# Patient Record
Sex: FEMALE | Race: WHITE | NOT HISPANIC OR LATINO | URBAN - METROPOLITAN AREA
[De-identification: names, ages, dates, MRNs, and addresses within clinical notes are randomized per-mention and may not be internally consistent; named-entity substitution may affect disease eponyms.]

---

## 2017-01-27 ENCOUNTER — HOSPITAL ENCOUNTER (OUTPATIENT)
Dept: CT IMAGING | Facility: CLINIC | Age: 72
Setting detail: RADIATION/ONCOLOGY SERIES
Discharge: STILL ADMITTED OR ADMINISTRATIVE | End: 2017-01-27
Attending: INTERNAL MEDICINE

## 2017-01-27 DIAGNOSIS — C34.12 MALIGNANT NEOPLASM OF UPPER LOBE OF LEFT LUNG (H): ICD-10-CM

## 2017-01-31 ENCOUNTER — OFFICE VISIT - HEALTHEAST (OUTPATIENT)
Dept: ONCOLOGY | Facility: HOSPITAL | Age: 72
End: 2017-01-31

## 2017-01-31 ENCOUNTER — AMBULATORY - HEALTHEAST (OUTPATIENT)
Dept: INFUSION THERAPY | Facility: HOSPITAL | Age: 72
End: 2017-01-31

## 2017-01-31 DIAGNOSIS — C34.12 MALIGNANT NEOPLASM OF UPPER LOBE OF LEFT LUNG (H): ICD-10-CM

## 2017-02-06 ENCOUNTER — COMMUNICATION - HEALTHEAST (OUTPATIENT)
Dept: FAMILY MEDICINE | Facility: CLINIC | Age: 72
End: 2017-02-06

## 2017-02-06 DIAGNOSIS — R79.9 ABNORMAL BLOOD CHEMISTRY: ICD-10-CM

## 2017-02-09 ENCOUNTER — COMMUNICATION - HEALTHEAST (OUTPATIENT)
Dept: FAMILY MEDICINE | Facility: CLINIC | Age: 72
End: 2017-02-09

## 2017-02-09 DIAGNOSIS — I10 ESSENTIAL HYPERTENSION: ICD-10-CM

## 2017-02-22 ENCOUNTER — COMMUNICATION - HEALTHEAST (OUTPATIENT)
Dept: ADMINISTRATIVE | Facility: HOSPITAL | Age: 72
End: 2017-02-22

## 2017-02-23 ENCOUNTER — COMMUNICATION - HEALTHEAST (OUTPATIENT)
Dept: FAMILY MEDICINE | Facility: CLINIC | Age: 72
End: 2017-02-23

## 2017-02-23 DIAGNOSIS — R52 PAIN: ICD-10-CM

## 2017-03-29 ENCOUNTER — HOSPITAL ENCOUNTER (OUTPATIENT)
Dept: CT IMAGING | Facility: CLINIC | Age: 72
Discharge: HOME | End: 2017-03-29
Attending: INTERNAL MEDICINE

## 2017-03-29 DIAGNOSIS — C34.12 MALIGNANT NEOPLASM OF UPPER LOBE OF LEFT LUNG (H): ICD-10-CM

## 2017-03-30 ENCOUNTER — AMBULATORY - HEALTHEAST (OUTPATIENT)
Dept: INFUSION THERAPY | Facility: HOSPITAL | Age: 72
End: 2017-03-30

## 2017-03-30 ENCOUNTER — OFFICE VISIT - HEALTHEAST (OUTPATIENT)
Dept: ONCOLOGY | Facility: HOSPITAL | Age: 72
End: 2017-03-30

## 2017-03-30 DIAGNOSIS — C34.12 MALIGNANT NEOPLASM OF UPPER LOBE OF LEFT LUNG (H): ICD-10-CM

## 2017-03-30 ASSESSMENT — MIFFLIN-ST. JEOR: SCORE: 1829.92

## 2017-03-31 ENCOUNTER — COMMUNICATION - HEALTHEAST (OUTPATIENT)
Dept: ONCOLOGY | Facility: CLINIC | Age: 72
End: 2017-03-31

## 2017-04-03 ENCOUNTER — COMMUNICATION - HEALTHEAST (OUTPATIENT)
Dept: ONCOLOGY | Facility: CLINIC | Age: 72
End: 2017-04-03

## 2017-04-19 ENCOUNTER — COMMUNICATION - HEALTHEAST (OUTPATIENT)
Dept: FAMILY MEDICINE | Facility: CLINIC | Age: 72
End: 2017-04-19

## 2017-04-19 DIAGNOSIS — R52 PAIN: ICD-10-CM

## 2017-05-08 ENCOUNTER — COMMUNICATION - HEALTHEAST (OUTPATIENT)
Dept: FAMILY MEDICINE | Facility: CLINIC | Age: 72
End: 2017-05-08

## 2017-05-10 ENCOUNTER — COMMUNICATION - HEALTHEAST (OUTPATIENT)
Dept: FAMILY MEDICINE | Facility: CLINIC | Age: 72
End: 2017-05-10

## 2017-05-24 ENCOUNTER — COMMUNICATION - HEALTHEAST (OUTPATIENT)
Dept: FAMILY MEDICINE | Facility: CLINIC | Age: 72
End: 2017-05-24

## 2017-05-24 ENCOUNTER — AMBULATORY - HEALTHEAST (OUTPATIENT)
Dept: FAMILY MEDICINE | Facility: CLINIC | Age: 72
End: 2017-05-24

## 2017-05-25 ENCOUNTER — AMBULATORY - HEALTHEAST (OUTPATIENT)
Dept: ONCOLOGY | Facility: HOSPITAL | Age: 72
End: 2017-05-25

## 2017-05-25 DIAGNOSIS — C34.12 MALIGNANT NEOPLASM OF UPPER LOBE OF LEFT LUNG (H): ICD-10-CM

## 2017-05-26 ENCOUNTER — AMBULATORY - HEALTHEAST (OUTPATIENT)
Dept: ONCOLOGY | Facility: HOSPITAL | Age: 72
End: 2017-05-26

## 2017-05-26 ENCOUNTER — HOME CARE/HOSPICE - HEALTHEAST (OUTPATIENT)
Dept: HOME HEALTH SERVICES | Facility: HOME HEALTH | Age: 72
End: 2017-05-26

## 2017-05-26 ENCOUNTER — COMMUNICATION - HEALTHEAST (OUTPATIENT)
Dept: ONCOLOGY | Facility: CLINIC | Age: 72
End: 2017-05-26

## 2017-05-26 DIAGNOSIS — I89.0 LYMPHEDEMA OF BOTH LOWER EXTREMITIES: ICD-10-CM

## 2017-05-30 ENCOUNTER — HOSPITAL ENCOUNTER (OUTPATIENT)
Dept: CT IMAGING | Facility: CLINIC | Age: 72
Discharge: HOME | End: 2017-05-30
Attending: INTERNAL MEDICINE

## 2017-05-30 DIAGNOSIS — C34.12 MALIGNANT NEOPLASM OF UPPER LOBE OF LEFT LUNG (H): ICD-10-CM

## 2017-05-31 ENCOUNTER — COMMUNICATION - HEALTHEAST (OUTPATIENT)
Dept: HOME HEALTH SERVICES | Facility: HOME HEALTH | Age: 72
End: 2017-05-31

## 2017-05-31 ENCOUNTER — HOME CARE/HOSPICE - HEALTHEAST (OUTPATIENT)
Dept: HOME HEALTH SERVICES | Facility: HOME HEALTH | Age: 72
End: 2017-05-31

## 2017-06-01 ENCOUNTER — AMBULATORY - HEALTHEAST (OUTPATIENT)
Dept: INFUSION THERAPY | Facility: HOSPITAL | Age: 72
End: 2017-06-01

## 2017-06-01 ENCOUNTER — HOME CARE/HOSPICE - HEALTHEAST (OUTPATIENT)
Dept: HOME HEALTH SERVICES | Facility: HOME HEALTH | Age: 72
End: 2017-06-01

## 2017-06-01 ENCOUNTER — OFFICE VISIT - HEALTHEAST (OUTPATIENT)
Dept: ONCOLOGY | Facility: HOSPITAL | Age: 72
End: 2017-06-01

## 2017-06-01 DIAGNOSIS — C34.12 MALIGNANT NEOPLASM OF UPPER LOBE OF LEFT LUNG (H): ICD-10-CM

## 2017-06-02 ENCOUNTER — HOME CARE/HOSPICE - HEALTHEAST (OUTPATIENT)
Dept: HOME HEALTH SERVICES | Facility: HOME HEALTH | Age: 72
End: 2017-06-02

## 2017-06-03 ENCOUNTER — HOME CARE/HOSPICE - HEALTHEAST (OUTPATIENT)
Dept: HOME HEALTH SERVICES | Facility: HOME HEALTH | Age: 72
End: 2017-06-03

## 2017-06-04 ENCOUNTER — HOME CARE/HOSPICE - HEALTHEAST (OUTPATIENT)
Dept: HOME HEALTH SERVICES | Facility: HOME HEALTH | Age: 72
End: 2017-06-04

## 2017-06-05 ENCOUNTER — HOME CARE/HOSPICE - HEALTHEAST (OUTPATIENT)
Dept: HOME HEALTH SERVICES | Facility: HOME HEALTH | Age: 72
End: 2017-06-05

## 2017-06-06 ENCOUNTER — COMMUNICATION - HEALTHEAST (OUTPATIENT)
Dept: FAMILY MEDICINE | Facility: CLINIC | Age: 72
End: 2017-06-06

## 2017-06-06 ENCOUNTER — HOME CARE/HOSPICE - HEALTHEAST (OUTPATIENT)
Dept: HOME HEALTH SERVICES | Facility: HOME HEALTH | Age: 72
End: 2017-06-06

## 2017-06-06 DIAGNOSIS — R52 PAIN: ICD-10-CM

## 2017-06-07 ENCOUNTER — HOME CARE/HOSPICE - HEALTHEAST (OUTPATIENT)
Dept: HOME HEALTH SERVICES | Facility: HOME HEALTH | Age: 72
End: 2017-06-07

## 2017-06-07 ENCOUNTER — AMBULATORY - HEALTHEAST (OUTPATIENT)
Dept: FAMILY MEDICINE | Facility: CLINIC | Age: 72
End: 2017-06-07

## 2017-06-07 DIAGNOSIS — R52 PAIN: ICD-10-CM

## 2017-06-08 ENCOUNTER — HOME CARE/HOSPICE - HEALTHEAST (OUTPATIENT)
Dept: HOME HEALTH SERVICES | Facility: HOME HEALTH | Age: 72
End: 2017-06-08

## 2017-06-09 ENCOUNTER — HOME CARE/HOSPICE - HEALTHEAST (OUTPATIENT)
Dept: HOME HEALTH SERVICES | Facility: HOME HEALTH | Age: 72
End: 2017-06-09

## 2017-06-12 ENCOUNTER — HOME CARE/HOSPICE - HEALTHEAST (OUTPATIENT)
Dept: HOME HEALTH SERVICES | Facility: HOME HEALTH | Age: 72
End: 2017-06-12

## 2017-07-10 ENCOUNTER — COMMUNICATION - HEALTHEAST (OUTPATIENT)
Dept: FAMILY MEDICINE | Facility: CLINIC | Age: 72
End: 2017-07-10

## 2017-07-10 DIAGNOSIS — R52 PAIN: ICD-10-CM

## 2017-07-28 ENCOUNTER — HOSPITAL ENCOUNTER (OUTPATIENT)
Dept: CT IMAGING | Facility: CLINIC | Age: 72
Discharge: HOME | End: 2017-07-28
Attending: INTERNAL MEDICINE

## 2017-07-28 DIAGNOSIS — C34.12 MALIGNANT NEOPLASM OF UPPER LOBE OF LEFT LUNG (H): ICD-10-CM

## 2017-08-01 ENCOUNTER — AMBULATORY - HEALTHEAST (OUTPATIENT)
Dept: INFUSION THERAPY | Facility: HOSPITAL | Age: 72
End: 2017-08-01

## 2017-08-01 ENCOUNTER — OFFICE VISIT - HEALTHEAST (OUTPATIENT)
Dept: ONCOLOGY | Facility: HOSPITAL | Age: 72
End: 2017-08-01

## 2017-08-01 DIAGNOSIS — C34.12 MALIGNANT NEOPLASM OF UPPER LOBE OF LEFT LUNG (H): ICD-10-CM

## 2017-08-01 DIAGNOSIS — E16.2 HYPOGLYCEMIA: ICD-10-CM

## 2017-08-02 LAB — HBA1C MFR BLD: 5.3 % (ref 4.2–6.1)

## 2017-08-07 ENCOUNTER — COMMUNICATION - HEALTHEAST (OUTPATIENT)
Dept: FAMILY MEDICINE | Facility: CLINIC | Age: 72
End: 2017-08-07

## 2017-08-08 ENCOUNTER — COMMUNICATION - HEALTHEAST (OUTPATIENT)
Dept: ONCOLOGY | Facility: HOSPITAL | Age: 72
End: 2017-08-08

## 2017-08-08 DIAGNOSIS — D64.9 ANEMIA: ICD-10-CM

## 2017-08-08 DIAGNOSIS — C34.12 MALIGNANT NEOPLASM OF UPPER LOBE OF LEFT LUNG (H): ICD-10-CM

## 2017-08-23 ENCOUNTER — COMMUNICATION - HEALTHEAST (OUTPATIENT)
Dept: FAMILY MEDICINE | Facility: CLINIC | Age: 72
End: 2017-08-23

## 2017-08-23 ENCOUNTER — COMMUNICATION - HEALTHEAST (OUTPATIENT)
Dept: ONCOLOGY | Facility: CLINIC | Age: 72
End: 2017-08-23

## 2017-08-23 DIAGNOSIS — R52 PAIN: ICD-10-CM

## 2017-08-29 ENCOUNTER — OFFICE VISIT - HEALTHEAST (OUTPATIENT)
Dept: FAMILY MEDICINE | Facility: CLINIC | Age: 72
End: 2017-08-29

## 2017-08-29 DIAGNOSIS — G89.29 CHRONIC PAIN: ICD-10-CM

## 2017-08-29 DIAGNOSIS — R11.0 NAUSEA: ICD-10-CM

## 2017-08-29 DIAGNOSIS — I87.8 LOWER EXTREMITY VENOUS STASIS: ICD-10-CM

## 2017-08-29 DIAGNOSIS — G04.91 MYELITIS (H): ICD-10-CM

## 2017-08-29 DIAGNOSIS — R06.02 SOB (SHORTNESS OF BREATH): ICD-10-CM

## 2017-08-29 DIAGNOSIS — Z12.31 VISIT FOR SCREENING MAMMOGRAM: ICD-10-CM

## 2017-08-29 ASSESSMENT — MIFFLIN-ST. JEOR: SCORE: 1768.68

## 2017-09-03 ENCOUNTER — COMMUNICATION - HEALTHEAST (OUTPATIENT)
Dept: FAMILY MEDICINE | Facility: CLINIC | Age: 72
End: 2017-09-03

## 2017-09-05 ENCOUNTER — AMBULATORY - HEALTHEAST (OUTPATIENT)
Dept: FAMILY MEDICINE | Facility: CLINIC | Age: 72
End: 2017-09-05

## 2017-09-11 ENCOUNTER — COMMUNICATION - HEALTHEAST (OUTPATIENT)
Dept: PHYSICAL MEDICINE AND REHAB | Facility: CLINIC | Age: 72
End: 2017-09-11

## 2017-09-14 ENCOUNTER — COMMUNICATION - HEALTHEAST (OUTPATIENT)
Dept: FAMILY MEDICINE | Facility: CLINIC | Age: 72
End: 2017-09-14

## 2017-09-27 ENCOUNTER — HOSPITAL ENCOUNTER (OUTPATIENT)
Dept: PHYSICAL MEDICINE AND REHAB | Facility: CLINIC | Age: 72
Discharge: HOME | End: 2017-09-27
Attending: PHYSICAL MEDICINE & REHABILITATION

## 2017-09-27 ENCOUNTER — COMMUNICATION - HEALTHEAST (OUTPATIENT)
Dept: FAMILY MEDICINE | Facility: CLINIC | Age: 72
End: 2017-09-27

## 2017-09-27 DIAGNOSIS — C34.90 LUNG CANCER (H): ICD-10-CM

## 2017-09-27 DIAGNOSIS — G62.0 PERIPHERAL NEUROPATHY DUE TO CHEMOTHERAPY (H): ICD-10-CM

## 2017-09-27 DIAGNOSIS — T45.1X5A PERIPHERAL NEUROPATHY DUE TO CHEMOTHERAPY (H): ICD-10-CM

## 2017-09-27 DIAGNOSIS — M54.41 LOW BACK PAIN WITH RIGHT-SIDED SCIATICA: ICD-10-CM

## 2017-09-27 DIAGNOSIS — M48.061 LUMBAR FORAMINAL STENOSIS: ICD-10-CM

## 2017-09-27 DIAGNOSIS — Z87.39 HISTORY OF BURNING PAIN IN LEG: ICD-10-CM

## 2017-09-27 ASSESSMENT — MIFFLIN-ST. JEOR: SCORE: 1768.68

## 2017-09-29 ENCOUNTER — AMBULATORY - HEALTHEAST (OUTPATIENT)
Dept: FAMILY MEDICINE | Facility: CLINIC | Age: 72
End: 2017-09-29

## 2017-10-02 ENCOUNTER — COMMUNICATION - HEALTHEAST (OUTPATIENT)
Dept: PHYSICAL MEDICINE AND REHAB | Facility: CLINIC | Age: 72
End: 2017-10-02

## 2017-10-30 ENCOUNTER — COMMUNICATION - HEALTHEAST (OUTPATIENT)
Dept: PHYSICAL MEDICINE AND REHAB | Facility: CLINIC | Age: 72
End: 2017-10-30

## 2017-11-01 ENCOUNTER — HOSPITAL ENCOUNTER (OUTPATIENT)
Dept: CT IMAGING | Facility: CLINIC | Age: 72
Discharge: HOME | End: 2017-11-01
Attending: INTERNAL MEDICINE

## 2017-11-01 DIAGNOSIS — C34.12 MALIGNANT NEOPLASM OF UPPER LOBE OF LEFT LUNG (H): ICD-10-CM

## 2017-11-03 ENCOUNTER — OFFICE VISIT - HEALTHEAST (OUTPATIENT)
Dept: ONCOLOGY | Facility: HOSPITAL | Age: 72
End: 2017-11-03

## 2017-11-03 ENCOUNTER — AMBULATORY - HEALTHEAST (OUTPATIENT)
Dept: INFUSION THERAPY | Facility: HOSPITAL | Age: 72
End: 2017-11-03

## 2017-11-03 DIAGNOSIS — C34.12 MALIGNANT NEOPLASM OF UPPER LOBE OF LEFT LUNG (H): ICD-10-CM

## 2017-11-06 ENCOUNTER — COMMUNICATION - HEALTHEAST (OUTPATIENT)
Dept: FAMILY MEDICINE | Facility: CLINIC | Age: 72
End: 2017-11-06

## 2017-11-08 ENCOUNTER — COMMUNICATION - HEALTHEAST (OUTPATIENT)
Dept: ONCOLOGY | Facility: CLINIC | Age: 72
End: 2017-11-08

## 2017-11-09 ENCOUNTER — AMBULATORY - HEALTHEAST (OUTPATIENT)
Dept: ONCOLOGY | Facility: HOSPITAL | Age: 72
End: 2017-11-09

## 2017-11-09 DIAGNOSIS — R73.9 HYPERGLYCEMIA: ICD-10-CM

## 2017-11-10 ENCOUNTER — INFUSION - HEALTHEAST (OUTPATIENT)
Dept: INFUSION THERAPY | Facility: HOSPITAL | Age: 72
End: 2017-11-10

## 2017-11-10 DIAGNOSIS — C34.12 MALIGNANT NEOPLASM OF UPPER LOBE OF LEFT LUNG (H): ICD-10-CM

## 2017-11-10 DIAGNOSIS — R73.9 HYPERGLYCEMIA: ICD-10-CM

## 2017-11-10 DIAGNOSIS — Z23 NEED FOR PNEUMOCOCCAL VACCINE: ICD-10-CM

## 2017-11-10 LAB — HBA1C MFR BLD: 6 % (ref 4.2–6.1)

## 2017-11-20 ENCOUNTER — AMBULATORY - HEALTHEAST (OUTPATIENT)
Dept: INFUSION THERAPY | Facility: HOSPITAL | Age: 72
End: 2017-11-20

## 2017-11-20 ENCOUNTER — OFFICE VISIT - HEALTHEAST (OUTPATIENT)
Dept: ONCOLOGY | Facility: HOSPITAL | Age: 72
End: 2017-11-20

## 2017-11-20 DIAGNOSIS — R53.83 FATIGUE: ICD-10-CM

## 2017-11-20 DIAGNOSIS — C34.12 MALIGNANT NEOPLASM OF UPPER LOBE OF LEFT LUNG (H): ICD-10-CM

## 2017-11-22 ENCOUNTER — COMMUNICATION - HEALTHEAST (OUTPATIENT)
Dept: ONCOLOGY | Facility: HOSPITAL | Age: 72
End: 2017-11-22

## 2017-12-01 ENCOUNTER — INFUSION - HEALTHEAST (OUTPATIENT)
Dept: INFUSION THERAPY | Facility: HOSPITAL | Age: 72
End: 2017-12-01

## 2017-12-01 DIAGNOSIS — C34.12 MALIGNANT NEOPLASM OF UPPER LOBE OF LEFT LUNG (H): ICD-10-CM

## 2017-12-08 ENCOUNTER — COMMUNICATION - HEALTHEAST (OUTPATIENT)
Dept: FAMILY MEDICINE | Facility: CLINIC | Age: 72
End: 2017-12-08

## 2017-12-11 ENCOUNTER — COMMUNICATION - HEALTHEAST (OUTPATIENT)
Dept: ONCOLOGY | Facility: HOSPITAL | Age: 72
End: 2017-12-11

## 2017-12-13 ENCOUNTER — HOME CARE/HOSPICE - HEALTHEAST (OUTPATIENT)
Dept: HOME HEALTH SERVICES | Facility: HOME HEALTH | Age: 72
End: 2017-12-13

## 2017-12-13 ENCOUNTER — COMMUNICATION - HEALTHEAST (OUTPATIENT)
Dept: FAMILY MEDICINE | Facility: CLINIC | Age: 72
End: 2017-12-13

## 2017-12-13 ENCOUNTER — AMBULATORY - HEALTHEAST (OUTPATIENT)
Dept: OTHER | Facility: CLINIC | Age: 72
End: 2017-12-13

## 2017-12-14 ENCOUNTER — COMMUNICATION - HEALTHEAST (OUTPATIENT)
Dept: ONCOLOGY | Facility: HOSPITAL | Age: 72
End: 2017-12-14

## 2017-12-14 ENCOUNTER — COMMUNICATION - HEALTHEAST (OUTPATIENT)
Dept: HOME HEALTH SERVICES | Facility: HOME HEALTH | Age: 72
End: 2017-12-14

## 2017-12-15 ENCOUNTER — COMMUNICATION - HEALTHEAST (OUTPATIENT)
Dept: FAMILY MEDICINE | Facility: CLINIC | Age: 72
End: 2017-12-15

## 2017-12-15 ENCOUNTER — AMBULATORY - HEALTHEAST (OUTPATIENT)
Dept: FAMILY MEDICINE | Facility: CLINIC | Age: 72
End: 2017-12-15

## 2017-12-15 DIAGNOSIS — C34.90 LUNG CANCER (H): ICD-10-CM

## 2017-12-16 ENCOUNTER — HOME CARE/HOSPICE - HEALTHEAST (OUTPATIENT)
Dept: HOME HEALTH SERVICES | Facility: HOME HEALTH | Age: 72
End: 2017-12-16

## 2017-12-19 ENCOUNTER — AMBULATORY - HEALTHEAST (OUTPATIENT)
Dept: FAMILY MEDICINE | Facility: CLINIC | Age: 72
End: 2017-12-19

## 2017-12-20 ENCOUNTER — HOSPITAL ENCOUNTER (OUTPATIENT)
Dept: CT IMAGING | Facility: CLINIC | Age: 72
Discharge: HOME | End: 2017-12-20

## 2017-12-20 DIAGNOSIS — C34.12 MALIGNANT NEOPLASM OF UPPER LOBE OF LEFT LUNG (H): ICD-10-CM

## 2017-12-22 ENCOUNTER — OFFICE VISIT - HEALTHEAST (OUTPATIENT)
Dept: ONCOLOGY | Facility: HOSPITAL | Age: 72
End: 2017-12-22

## 2017-12-22 ENCOUNTER — INFUSION - HEALTHEAST (OUTPATIENT)
Dept: INFUSION THERAPY | Facility: HOSPITAL | Age: 72
End: 2017-12-22

## 2017-12-22 ENCOUNTER — AMBULATORY - HEALTHEAST (OUTPATIENT)
Dept: INFUSION THERAPY | Facility: HOSPITAL | Age: 72
End: 2017-12-22

## 2017-12-22 DIAGNOSIS — C34.10 LUNG CANCER, UPPER LOBE (H): ICD-10-CM

## 2017-12-22 DIAGNOSIS — C34.12 MALIGNANT NEOPLASM OF UPPER LOBE OF LEFT LUNG (H): ICD-10-CM

## 2017-12-27 ENCOUNTER — COMMUNICATION - HEALTHEAST (OUTPATIENT)
Dept: PHYSICAL MEDICINE AND REHAB | Facility: CLINIC | Age: 72
End: 2017-12-27

## 2018-01-11 ENCOUNTER — AMBULATORY - HEALTHEAST (OUTPATIENT)
Dept: INFUSION THERAPY | Facility: HOSPITAL | Age: 73
End: 2018-01-11

## 2018-01-11 ENCOUNTER — INFUSION - HEALTHEAST (OUTPATIENT)
Dept: INFUSION THERAPY | Facility: HOSPITAL | Age: 73
End: 2018-01-11

## 2018-01-11 ENCOUNTER — RECORDS - HEALTHEAST (OUTPATIENT)
Dept: ADMINISTRATIVE | Facility: OTHER | Age: 73
End: 2018-01-11

## 2018-01-11 ENCOUNTER — OFFICE VISIT - HEALTHEAST (OUTPATIENT)
Dept: ONCOLOGY | Facility: HOSPITAL | Age: 73
End: 2018-01-11

## 2018-01-11 DIAGNOSIS — C34.12 MALIGNANT NEOPLASM OF UPPER LOBE OF LEFT LUNG (H): ICD-10-CM

## 2018-01-11 DIAGNOSIS — D64.9 ANEMIA: ICD-10-CM

## 2018-01-11 LAB
ABO/RH(D): NORMAL
ALBUMIN SERPL-MCNC: 2.9 G/DL (ref 3.5–5)
ALP SERPL-CCNC: 132 U/L (ref 45–120)
ALT SERPL W P-5'-P-CCNC: 12 U/L (ref 0–45)
ANION GAP SERPL CALCULATED.3IONS-SCNC: 18 MMOL/L (ref 5–18)
ANTIBODY SCREEN: NEGATIVE
AST SERPL W P-5'-P-CCNC: 33 U/L (ref 0–40)
BASOPHILS # BLD AUTO: 0 THOU/UL (ref 0–0.2)
BASOPHILS NFR BLD AUTO: 0 % (ref 0–2)
BILIRUB SERPL-MCNC: 1.7 MG/DL (ref 0–1)
BUN SERPL-MCNC: 16 MG/DL (ref 8–28)
CALCIUM SERPL-MCNC: 9.3 MG/DL (ref 8.5–10.5)
CHLORIDE BLD-SCNC: 99 MMOL/L (ref 98–107)
CO2 SERPL-SCNC: 24 MMOL/L (ref 22–31)
CREAT SERPL-MCNC: 0.79 MG/DL (ref 0.6–1.1)
DAT, IGG, C3D (HISTORICAL CONVERSION): NORMAL
EOSINOPHIL # BLD AUTO: 0 THOU/UL (ref 0–0.4)
EOSINOPHIL NFR BLD AUTO: 0 % (ref 0–6)
ERYTHROCYTE [DISTWIDTH] IN BLOOD BY AUTOMATED COUNT: 18.6 % (ref 11–14.5)
FOLATE SERPL-MCNC: 17.7 NG/ML
GFR SERPL CREATININE-BSD FRML MDRD: >60 ML/MIN/1.73M2
GLUCOSE BLD-MCNC: 143 MG/DL (ref 70–125)
HAPTOGLOB SERPL-MCNC: 153 MG/DL (ref 33–171)
HCT VFR BLD AUTO: 25.7 % (ref 35–47)
HGB BLD-MCNC: 7.7 G/DL (ref 12–16)
LYMPHOCYTES # BLD AUTO: 0.6 THOU/UL (ref 0.8–4.4)
LYMPHOCYTES NFR BLD AUTO: 8 % (ref 20–40)
MCH RBC QN AUTO: 31.7 PG (ref 27–34)
MCHC RBC AUTO-ENTMCNC: 30 G/DL (ref 32–36)
MCV RBC AUTO: 106 FL (ref 80–100)
MONOCYTES # BLD AUTO: 0.2 THOU/UL (ref 0–0.9)
MONOCYTES NFR BLD AUTO: 3 % (ref 2–10)
NEUTROPHILS # BLD AUTO: 5.8 THOU/UL (ref 2–7.7)
NEUTROPHILS NFR BLD AUTO: 89 % (ref 50–70)
PLATELET # BLD AUTO: 337 THOU/UL (ref 140–440)
PMV BLD AUTO: 9.4 FL (ref 8.5–12.5)
POTASSIUM BLD-SCNC: 3.8 MMOL/L (ref 3.5–5)
PROT SERPL-MCNC: 7 G/DL (ref 6–8)
RBC # BLD AUTO: 2.43 MILL/UL (ref 3.8–5.4)
SODIUM SERPL-SCNC: 141 MMOL/L (ref 136–145)
TSH SERPL DL<=0.005 MIU/L-ACNC: 0.46 UIU/ML (ref 0.3–5)
VIT B12 SERPL-MCNC: 690 PG/ML (ref 213–816)
WBC: 6.7 THOU/UL (ref 4–11)

## 2018-01-12 LAB
BLD PROD TYP BPU: NORMAL
BLOOD EXPIRATION DATE: NORMAL
BLOOD TYPE: 9500
CODING SYSTEM: NORMAL
COMPONENT (HISTORICAL CONVERSION): NORMAL
CROSSMATCH: NORMAL
ISSUE DATE AND TIME: NORMAL
STATUS (HISTORICAL CONVERSION): NORMAL
UNIT ABO/RH (HISTORICAL CONVERSION): NORMAL
UNIT NUMBER: NORMAL

## 2018-01-17 ENCOUNTER — COMMUNICATION - HEALTHEAST (OUTPATIENT)
Dept: ONCOLOGY | Facility: CLINIC | Age: 73
End: 2018-01-17

## 2018-01-19 ENCOUNTER — COMMUNICATION - HEALTHEAST (OUTPATIENT)
Dept: ONCOLOGY | Facility: HOSPITAL | Age: 73
End: 2018-01-19

## 2018-01-19 ENCOUNTER — COMMUNICATION - HEALTHEAST (OUTPATIENT)
Dept: ONCOLOGY | Facility: CLINIC | Age: 73
End: 2018-01-19

## 2018-01-25 ENCOUNTER — AMBULATORY - HEALTHEAST (OUTPATIENT)
Dept: ONCOLOGY | Facility: CLINIC | Age: 73
End: 2018-01-25

## 2018-01-30 ENCOUNTER — HOSPITAL ENCOUNTER (OUTPATIENT)
Dept: CT IMAGING | Facility: CLINIC | Age: 73
Setting detail: RADIATION/ONCOLOGY SERIES
Discharge: STILL ADMITTED OR ADMINISTRATIVE | End: 2018-01-30

## 2018-01-30 DIAGNOSIS — C34.12 MALIGNANT NEOPLASM OF UPPER LOBE OF LEFT LUNG (H): ICD-10-CM

## 2018-02-02 ENCOUNTER — HOME CARE/HOSPICE - HEALTHEAST (OUTPATIENT)
Dept: HOSPICE | Facility: HOSPICE | Age: 73
End: 2018-02-02

## 2018-02-02 ENCOUNTER — AMBULATORY - HEALTHEAST (OUTPATIENT)
Dept: INFUSION THERAPY | Facility: HOSPITAL | Age: 73
End: 2018-02-02

## 2018-02-02 ENCOUNTER — INFUSION - HEALTHEAST (OUTPATIENT)
Dept: INFUSION THERAPY | Facility: HOSPITAL | Age: 73
End: 2018-02-02

## 2018-02-02 ENCOUNTER — OFFICE VISIT - HEALTHEAST (OUTPATIENT)
Dept: ONCOLOGY | Facility: HOSPITAL | Age: 73
End: 2018-02-02

## 2018-02-02 DIAGNOSIS — C34.12 MALIGNANT NEOPLASM OF UPPER LOBE OF LEFT LUNG (H): ICD-10-CM

## 2018-02-02 DIAGNOSIS — C34.11 MALIGNANT NEOPLASM OF UPPER LOBE OF RIGHT LUNG (H): ICD-10-CM

## 2018-02-02 LAB
ALBUMIN SERPL-MCNC: 3.1 G/DL (ref 3.5–5)
ALP SERPL-CCNC: 129 U/L (ref 45–120)
ALT SERPL W P-5'-P-CCNC: 11 U/L (ref 0–45)
ANION GAP SERPL CALCULATED.3IONS-SCNC: 11 MMOL/L (ref 5–18)
AST SERPL W P-5'-P-CCNC: 27 U/L (ref 0–40)
BASOPHILS # BLD AUTO: 0 THOU/UL (ref 0–0.2)
BASOPHILS NFR BLD AUTO: 0 % (ref 0–2)
BILIRUB SERPL-MCNC: 1.1 MG/DL (ref 0–1)
BUN SERPL-MCNC: 9 MG/DL (ref 8–28)
CALCIUM SERPL-MCNC: 9.4 MG/DL (ref 8.5–10.5)
CHLORIDE BLD-SCNC: 101 MMOL/L (ref 98–107)
CO2 SERPL-SCNC: 32 MMOL/L (ref 22–31)
CREAT SERPL-MCNC: 0.66 MG/DL (ref 0.6–1.1)
EOSINOPHIL # BLD AUTO: 0 THOU/UL (ref 0–0.4)
EOSINOPHIL NFR BLD AUTO: 0 % (ref 0–6)
ERYTHROCYTE [DISTWIDTH] IN BLOOD BY AUTOMATED COUNT: 20.7 % (ref 11–14.5)
GFR SERPL CREATININE-BSD FRML MDRD: >60 ML/MIN/1.73M2
GLUCOSE BLD-MCNC: 136 MG/DL (ref 70–125)
HCT VFR BLD AUTO: 29.8 % (ref 35–47)
HGB BLD-MCNC: 8.5 G/DL (ref 12–16)
LYMPHOCYTES # BLD AUTO: 0.5 THOU/UL (ref 0.8–4.4)
LYMPHOCYTES NFR BLD AUTO: 11 % (ref 20–40)
MCH RBC QN AUTO: 32.2 PG (ref 27–34)
MCHC RBC AUTO-ENTMCNC: 28.5 G/DL (ref 32–36)
MCV RBC AUTO: 113 FL (ref 80–100)
MONOCYTES # BLD AUTO: 0.2 THOU/UL (ref 0–0.9)
MONOCYTES NFR BLD AUTO: 4 % (ref 2–10)
NEUTROPHILS # BLD AUTO: 3.6 THOU/UL (ref 2–7.7)
NEUTROPHILS NFR BLD AUTO: 85 % (ref 50–70)
PLAT MORPH BLD: NORMAL
PLATELET # BLD AUTO: 236 THOU/UL (ref 140–440)
PMV BLD AUTO: 9.5 FL (ref 8.5–12.5)
POLYCHROMASIA BLD QL SMEAR: ABNORMAL
POTASSIUM BLD-SCNC: 3.8 MMOL/L (ref 3.5–5)
PROT SERPL-MCNC: 7 G/DL (ref 6–8)
RBC # BLD AUTO: 2.64 MILL/UL (ref 3.8–5.4)
ROULEAUX BLD QL SMEAR: PRESENT
SODIUM SERPL-SCNC: 144 MMOL/L (ref 136–145)
TSH SERPL DL<=0.005 MIU/L-ACNC: 0.65 UIU/ML (ref 0.3–5)
WBC: 4.3 THOU/UL (ref 4–11)

## 2018-02-04 ENCOUNTER — HOME CARE/HOSPICE - HEALTHEAST (OUTPATIENT)
Dept: HOSPICE | Facility: HOSPICE | Age: 73
End: 2018-02-04

## 2018-02-05 ENCOUNTER — AMBULATORY - HEALTHEAST (OUTPATIENT)
Dept: ONCOLOGY | Facility: CLINIC | Age: 73
End: 2018-02-05

## 2018-02-06 ENCOUNTER — HOME CARE/HOSPICE - HEALTHEAST (OUTPATIENT)
Dept: HOSPICE | Facility: HOSPICE | Age: 73
End: 2018-02-06

## 2018-02-13 ENCOUNTER — COMMUNICATION - HEALTHEAST (OUTPATIENT)
Dept: ONCOLOGY | Facility: CLINIC | Age: 73
End: 2018-02-13

## 2018-02-16 ENCOUNTER — AMBULATORY - HEALTHEAST (OUTPATIENT)
Dept: INFUSION THERAPY | Facility: HOSPITAL | Age: 73
End: 2018-02-16

## 2018-02-16 ENCOUNTER — INFUSION - HEALTHEAST (OUTPATIENT)
Dept: INFUSION THERAPY | Facility: HOSPITAL | Age: 73
End: 2018-02-16

## 2018-02-16 ENCOUNTER — OFFICE VISIT - HEALTHEAST (OUTPATIENT)
Dept: ONCOLOGY | Facility: HOSPITAL | Age: 73
End: 2018-02-16

## 2018-02-16 DIAGNOSIS — C34.12 MALIGNANT NEOPLASM OF UPPER LOBE OF LEFT LUNG (H): ICD-10-CM

## 2018-02-16 DIAGNOSIS — R06.09 DOE (DYSPNEA ON EXERTION): ICD-10-CM

## 2018-02-16 DIAGNOSIS — R29.898 LEG WEAKNESS, BILATERAL: ICD-10-CM

## 2018-02-22 ENCOUNTER — COMMUNICATION - HEALTHEAST (OUTPATIENT)
Dept: ONCOLOGY | Facility: CLINIC | Age: 73
End: 2018-02-22

## 2018-02-26 ENCOUNTER — COMMUNICATION - HEALTHEAST (OUTPATIENT)
Dept: ONCOLOGY | Facility: CLINIC | Age: 73
End: 2018-02-26

## 2018-03-05 ENCOUNTER — AMBULATORY - HEALTHEAST (OUTPATIENT)
Dept: INFUSION THERAPY | Facility: HOSPITAL | Age: 73
End: 2018-03-05

## 2018-03-05 ENCOUNTER — OFFICE VISIT - HEALTHEAST (OUTPATIENT)
Dept: ONCOLOGY | Facility: HOSPITAL | Age: 73
End: 2018-03-05

## 2018-03-05 ENCOUNTER — INFUSION - HEALTHEAST (OUTPATIENT)
Dept: INFUSION THERAPY | Facility: HOSPITAL | Age: 73
End: 2018-03-05

## 2018-03-05 DIAGNOSIS — C34.12 MALIGNANT NEOPLASM OF UPPER LOBE OF LEFT LUNG (H): ICD-10-CM

## 2018-03-05 LAB
ALBUMIN SERPL-MCNC: 3.2 G/DL (ref 3.5–5)
ALP SERPL-CCNC: 139 U/L (ref 45–120)
ALT SERPL W P-5'-P-CCNC: <9 U/L (ref 0–45)
ANION GAP SERPL CALCULATED.3IONS-SCNC: 10 MMOL/L (ref 5–18)
AST SERPL W P-5'-P-CCNC: 28 U/L (ref 0–40)
BASOPHILS # BLD AUTO: 0 THOU/UL (ref 0–0.2)
BASOPHILS NFR BLD AUTO: 1 % (ref 0–2)
BILIRUB SERPL-MCNC: 1 MG/DL (ref 0–1)
BUN SERPL-MCNC: 8 MG/DL (ref 8–28)
CALCIUM SERPL-MCNC: 9.4 MG/DL (ref 8.5–10.5)
CHLORIDE BLD-SCNC: 103 MMOL/L (ref 98–107)
CO2 SERPL-SCNC: 31 MMOL/L (ref 22–31)
CREAT SERPL-MCNC: 0.65 MG/DL (ref 0.6–1.1)
EOSINOPHIL # BLD AUTO: 0.5 THOU/UL (ref 0–0.4)
EOSINOPHIL NFR BLD AUTO: 9 % (ref 0–6)
ERYTHROCYTE [DISTWIDTH] IN BLOOD BY AUTOMATED COUNT: 13.7 % (ref 11–14.5)
GFR SERPL CREATININE-BSD FRML MDRD: >60 ML/MIN/1.73M2
GLUCOSE BLD-MCNC: 103 MG/DL (ref 70–125)
HCT VFR BLD AUTO: 39.2 % (ref 35–47)
HGB BLD-MCNC: 11.7 G/DL (ref 12–16)
LYMPHOCYTES # BLD AUTO: 0.6 THOU/UL (ref 0.8–4.4)
LYMPHOCYTES NFR BLD AUTO: 10 % (ref 20–40)
MCH RBC QN AUTO: 31.1 PG (ref 27–34)
MCHC RBC AUTO-ENTMCNC: 29.8 G/DL (ref 32–36)
MCV RBC AUTO: 104 FL (ref 80–100)
MONOCYTES # BLD AUTO: 0.4 THOU/UL (ref 0–0.9)
MONOCYTES NFR BLD AUTO: 7 % (ref 2–10)
NEUTROPHILS # BLD AUTO: 4.3 THOU/UL (ref 2–7.7)
NEUTROPHILS NFR BLD AUTO: 73 % (ref 50–70)
PLATELET # BLD AUTO: 196 THOU/UL (ref 140–440)
PMV BLD AUTO: 9.7 FL (ref 8.5–12.5)
POTASSIUM BLD-SCNC: 3.8 MMOL/L (ref 3.5–5)
PROT SERPL-MCNC: 7.6 G/DL (ref 6–8)
RBC # BLD AUTO: 3.76 MILL/UL (ref 3.8–5.4)
SODIUM SERPL-SCNC: 144 MMOL/L (ref 136–145)
TSH SERPL DL<=0.005 MIU/L-ACNC: 0.91 UIU/ML (ref 0.3–5)
WBC: 5.9 THOU/UL (ref 4–11)

## 2018-03-16 ENCOUNTER — INFUSION - HEALTHEAST (OUTPATIENT)
Dept: INFUSION THERAPY | Facility: HOSPITAL | Age: 73
End: 2018-03-16

## 2018-03-16 DIAGNOSIS — C34.12 MALIGNANT NEOPLASM OF UPPER LOBE OF LEFT LUNG (H): ICD-10-CM

## 2018-03-28 ENCOUNTER — HOSPITAL ENCOUNTER (OUTPATIENT)
Dept: CT IMAGING | Facility: CLINIC | Age: 73
Discharge: HOME | End: 2018-03-28
Attending: INTERNAL MEDICINE

## 2018-03-28 DIAGNOSIS — C34.12 MALIGNANT NEOPLASM OF UPPER LOBE OF LEFT LUNG (H): ICD-10-CM

## 2018-03-30 ENCOUNTER — AMBULATORY - HEALTHEAST (OUTPATIENT)
Dept: INFUSION THERAPY | Facility: HOSPITAL | Age: 73
End: 2018-03-30

## 2018-03-30 ENCOUNTER — HOME CARE/HOSPICE - HEALTHEAST (OUTPATIENT)
Dept: HOSPICE | Facility: HOSPICE | Age: 73
End: 2018-03-30

## 2018-03-30 ENCOUNTER — OFFICE VISIT - HEALTHEAST (OUTPATIENT)
Dept: ONCOLOGY | Facility: HOSPITAL | Age: 73
End: 2018-03-30

## 2018-03-30 ENCOUNTER — INFUSION - HEALTHEAST (OUTPATIENT)
Dept: INFUSION THERAPY | Facility: HOSPITAL | Age: 73
End: 2018-03-30

## 2018-03-30 DIAGNOSIS — C34.12 MALIGNANT NEOPLASM OF UPPER LOBE OF LEFT LUNG (H): ICD-10-CM

## 2018-03-30 LAB
ALBUMIN SERPL-MCNC: 2.9 G/DL (ref 3.5–5)
ALP SERPL-CCNC: 151 U/L (ref 45–120)
ALT SERPL W P-5'-P-CCNC: 9 U/L (ref 0–45)
ANION GAP SERPL CALCULATED.3IONS-SCNC: 9 MMOL/L (ref 5–18)
AST SERPL W P-5'-P-CCNC: 21 U/L (ref 0–40)
BASOPHILS # BLD AUTO: 0.1 THOU/UL (ref 0–0.2)
BASOPHILS NFR BLD AUTO: 1 % (ref 0–2)
BILIRUB SERPL-MCNC: 0.9 MG/DL (ref 0–1)
BUN SERPL-MCNC: 9 MG/DL (ref 8–28)
CALCIUM SERPL-MCNC: 8.9 MG/DL (ref 8.5–10.5)
CHLORIDE BLD-SCNC: 101 MMOL/L (ref 98–107)
CO2 SERPL-SCNC: 36 MMOL/L (ref 22–31)
CREAT SERPL-MCNC: 0.6 MG/DL (ref 0.6–1.1)
EOSINOPHIL COUNT (ABSOLUTE): 1.1 THOU/UL (ref 0–0.4)
EOSINOPHIL NFR BLD AUTO: 17 % (ref 0–6)
ERYTHROCYTE [DISTWIDTH] IN BLOOD BY AUTOMATED COUNT: 13.9 % (ref 11–14.5)
GFR SERPL CREATININE-BSD FRML MDRD: >60 ML/MIN/1.73M2
GLUCOSE BLD-MCNC: 112 MG/DL (ref 70–125)
HCT VFR BLD AUTO: 35.5 % (ref 35–47)
HGB BLD-MCNC: 10.8 G/DL (ref 12–16)
LYMPHOCYTES # BLD AUTO: 0.8 THOU/UL (ref 0.8–4.4)
LYMPHOCYTES NFR BLD AUTO: 13 % (ref 20–40)
MCH RBC QN AUTO: 30.9 PG (ref 27–34)
MCHC RBC AUTO-ENTMCNC: 30.4 G/DL (ref 32–36)
MCV RBC AUTO: 101 FL (ref 80–100)
MONOCYTES # BLD AUTO: 0.3 THOU/UL (ref 0–0.9)
MONOCYTES NFR BLD AUTO: 5 % (ref 2–10)
OVALOCYTES: ABNORMAL
PLAT MORPH BLD: NORMAL
PLATELET # BLD AUTO: 151 THOU/UL (ref 140–440)
PMV BLD AUTO: 9.1 FL (ref 8.5–12.5)
POTASSIUM BLD-SCNC: 3.6 MMOL/L (ref 3.5–5)
PROT SERPL-MCNC: 7.1 G/DL (ref 6–8)
RBC # BLD AUTO: 3.5 MILL/UL (ref 3.8–5.4)
SODIUM SERPL-SCNC: 146 MMOL/L (ref 136–145)
TOTAL NEUTROPHILS-ABS(DIFF): 4.1 THOU/UL (ref 2–7.7)
TOTAL NEUTROPHILS-REL(DIFF): 64 % (ref 50–70)
TSH SERPL DL<=0.005 MIU/L-ACNC: 0.92 UIU/ML (ref 0.3–5)
WBC: 6.4 THOU/UL (ref 4–11)

## 2018-04-04 ENCOUNTER — HOME CARE/HOSPICE - HEALTHEAST (OUTPATIENT)
Dept: HOSPICE | Facility: HOSPICE | Age: 73
End: 2018-04-04

## 2018-04-04 RX ORDER — LORAZEPAM 0.5 MG/1
0.5-2 TABLET ORAL EVERY 4 HOURS PRN
Status: SHIPPED | COMMUNITY
Start: 2018-04-04

## 2018-04-04 RX ORDER — HALOPERIDOL 2 MG/ML
1 SOLUTION ORAL EVERY 4 HOURS PRN
Status: SHIPPED | COMMUNITY
Start: 2018-05-23

## 2018-04-04 RX ORDER — ATROPINE SULFATE 10 MG/ML
1-2 SOLUTION/ DROPS OPHTHALMIC EVERY 4 HOURS PRN
Status: SHIPPED | COMMUNITY
Start: 2018-04-04

## 2018-04-04 RX ORDER — BISACODYL 10 MG
10 SUPPOSITORY, RECTAL RECTAL DAILY PRN
Status: SHIPPED | COMMUNITY
Start: 2018-04-04

## 2018-04-05 ENCOUNTER — HOME CARE/HOSPICE - HEALTHEAST (OUTPATIENT)
Dept: HOSPICE | Facility: HOSPICE | Age: 73
End: 2018-04-05

## 2018-04-05 RX ORDER — GUAIFENESIN 600 MG/1
1-2 TABLET, EXTENDED RELEASE ORAL 2 TIMES DAILY
Status: SHIPPED | COMMUNITY
Start: 2018-08-22

## 2018-04-05 ASSESSMENT — MIFFLIN-ST. JEOR: SCORE: 1201.68

## 2018-04-06 ENCOUNTER — HOME CARE/HOSPICE - HEALTHEAST (OUTPATIENT)
Dept: HOSPICE | Facility: HOSPICE | Age: 73
End: 2018-04-06

## 2018-04-10 ENCOUNTER — HOME CARE/HOSPICE - HEALTHEAST (OUTPATIENT)
Dept: HOSPICE | Facility: HOSPICE | Age: 73
End: 2018-04-10

## 2018-04-10 ENCOUNTER — AMBULATORY - HEALTHEAST (OUTPATIENT)
Dept: HOSPICE | Facility: HOSPICE | Age: 73
End: 2018-04-10

## 2018-04-10 RX ORDER — TRAZODONE HYDROCHLORIDE 50 MG/1
50 TABLET, FILM COATED ORAL AT BEDTIME
Status: SHIPPED | COMMUNITY
Start: 2018-04-10

## 2018-04-10 RX ORDER — NAPROXEN SODIUM 220 MG
220 TABLET ORAL 2 TIMES DAILY WITH MEALS
Status: SHIPPED | COMMUNITY
Start: 2018-04-10

## 2018-04-12 ENCOUNTER — HOME CARE/HOSPICE - HEALTHEAST (OUTPATIENT)
Dept: HOSPICE | Facility: HOSPICE | Age: 73
End: 2018-04-12

## 2018-04-12 RX ORDER — ACETAMINOPHEN 500 MG
1000 TABLET ORAL EVERY 6 HOURS PRN
Status: SHIPPED | COMMUNITY
Start: 2018-04-12

## 2018-04-13 ENCOUNTER — HOME CARE/HOSPICE - HEALTHEAST (OUTPATIENT)
Dept: HOSPICE | Facility: HOSPICE | Age: 73
End: 2018-04-13

## 2018-04-17 ENCOUNTER — HOME CARE/HOSPICE - HEALTHEAST (OUTPATIENT)
Dept: HOSPICE | Facility: HOSPICE | Age: 73
End: 2018-04-17

## 2018-04-19 ENCOUNTER — HOME CARE/HOSPICE - HEALTHEAST (OUTPATIENT)
Dept: HOSPICE | Facility: HOSPICE | Age: 73
End: 2018-04-19

## 2018-04-19 ENCOUNTER — AMBULATORY - HEALTHEAST (OUTPATIENT)
Dept: HOSPICE | Facility: HOSPICE | Age: 73
End: 2018-04-19

## 2018-04-19 ENCOUNTER — COMMUNICATION - HEALTHEAST (OUTPATIENT)
Dept: FAMILY MEDICINE | Facility: CLINIC | Age: 73
End: 2018-04-19

## 2018-04-20 ENCOUNTER — HOME CARE/HOSPICE - HEALTHEAST (OUTPATIENT)
Dept: HOSPICE | Facility: HOSPICE | Age: 73
End: 2018-04-20

## 2018-04-24 ENCOUNTER — HOME CARE/HOSPICE - HEALTHEAST (OUTPATIENT)
Dept: HOSPICE | Facility: HOSPICE | Age: 73
End: 2018-04-24

## 2018-04-26 ENCOUNTER — HOME CARE/HOSPICE - HEALTHEAST (OUTPATIENT)
Dept: HOSPICE | Facility: HOSPICE | Age: 73
End: 2018-04-26

## 2018-04-27 ENCOUNTER — HOME CARE/HOSPICE - HEALTHEAST (OUTPATIENT)
Dept: HOSPICE | Facility: HOSPICE | Age: 73
End: 2018-04-27

## 2018-05-01 ENCOUNTER — HOME CARE/HOSPICE - HEALTHEAST (OUTPATIENT)
Dept: HOSPICE | Facility: HOSPICE | Age: 73
End: 2018-05-01

## 2018-05-03 ENCOUNTER — HOME CARE/HOSPICE - HEALTHEAST (OUTPATIENT)
Dept: HOSPICE | Facility: HOSPICE | Age: 73
End: 2018-05-03

## 2018-05-03 ENCOUNTER — AMBULATORY - HEALTHEAST (OUTPATIENT)
Dept: HOSPICE | Facility: HOSPICE | Age: 73
End: 2018-05-03

## 2018-05-04 ENCOUNTER — HOME CARE/HOSPICE - HEALTHEAST (OUTPATIENT)
Dept: HOSPICE | Facility: HOSPICE | Age: 73
End: 2018-05-04

## 2018-05-08 ENCOUNTER — COMMUNICATION - HEALTHEAST (OUTPATIENT)
Dept: ONCOLOGY | Facility: CLINIC | Age: 73
End: 2018-05-08

## 2018-05-08 ENCOUNTER — HOME CARE/HOSPICE - HEALTHEAST (OUTPATIENT)
Dept: HOSPICE | Facility: HOSPICE | Age: 73
End: 2018-05-08

## 2018-05-09 ENCOUNTER — HOME CARE/HOSPICE - HEALTHEAST (OUTPATIENT)
Dept: HOSPICE | Facility: HOSPICE | Age: 73
End: 2018-05-09

## 2018-05-10 ENCOUNTER — HOME CARE/HOSPICE - HEALTHEAST (OUTPATIENT)
Dept: HOSPICE | Facility: HOSPICE | Age: 73
End: 2018-05-10

## 2018-05-11 ENCOUNTER — HOME CARE/HOSPICE - HEALTHEAST (OUTPATIENT)
Dept: HOSPICE | Facility: HOSPICE | Age: 73
End: 2018-05-11

## 2018-05-15 ENCOUNTER — HOME CARE/HOSPICE - HEALTHEAST (OUTPATIENT)
Dept: HOSPICE | Facility: HOSPICE | Age: 73
End: 2018-05-15

## 2018-05-16 ENCOUNTER — COMMUNICATION - HEALTHEAST (OUTPATIENT)
Dept: ONCOLOGY | Facility: CLINIC | Age: 73
End: 2018-05-16

## 2018-05-16 ENCOUNTER — HOME CARE/HOSPICE - HEALTHEAST (OUTPATIENT)
Dept: HOSPICE | Facility: HOSPICE | Age: 73
End: 2018-05-16

## 2018-05-17 ENCOUNTER — AMBULATORY - HEALTHEAST (OUTPATIENT)
Dept: HOSPICE | Facility: HOSPICE | Age: 73
End: 2018-05-17

## 2018-05-18 ENCOUNTER — HOME CARE/HOSPICE - HEALTHEAST (OUTPATIENT)
Dept: HOSPICE | Facility: HOSPICE | Age: 73
End: 2018-05-18

## 2018-05-21 ENCOUNTER — HOME CARE/HOSPICE - HEALTHEAST (OUTPATIENT)
Dept: HOSPICE | Facility: HOSPICE | Age: 73
End: 2018-05-21

## 2018-05-22 ENCOUNTER — HOME CARE/HOSPICE - HEALTHEAST (OUTPATIENT)
Dept: HOSPICE | Facility: HOSPICE | Age: 73
End: 2018-05-22

## 2018-05-22 ENCOUNTER — COMMUNICATION - HEALTHEAST (OUTPATIENT)
Dept: FAMILY MEDICINE | Facility: CLINIC | Age: 73
End: 2018-05-22

## 2018-05-23 ENCOUNTER — HOME CARE/HOSPICE - HEALTHEAST (OUTPATIENT)
Dept: HOSPICE | Facility: HOSPICE | Age: 73
End: 2018-05-23

## 2018-05-24 ENCOUNTER — HOME CARE/HOSPICE - HEALTHEAST (OUTPATIENT)
Dept: HOSPICE | Facility: HOSPICE | Age: 73
End: 2018-05-24

## 2018-05-25 ENCOUNTER — HOME CARE/HOSPICE - HEALTHEAST (OUTPATIENT)
Dept: HOSPICE | Facility: HOSPICE | Age: 73
End: 2018-05-25

## 2018-05-29 ENCOUNTER — HOME CARE/HOSPICE - HEALTHEAST (OUTPATIENT)
Dept: HOSPICE | Facility: HOSPICE | Age: 73
End: 2018-05-29

## 2018-05-30 ENCOUNTER — HOME CARE/HOSPICE - HEALTHEAST (OUTPATIENT)
Dept: HOSPICE | Facility: HOSPICE | Age: 73
End: 2018-05-30

## 2018-05-30 RX ORDER — HYDROMORPHONE HYDROCHLORIDE 1 MG/ML
2 SOLUTION ORAL
Status: SHIPPED | COMMUNITY
Start: 2018-09-12

## 2018-05-31 ENCOUNTER — AMBULATORY - HEALTHEAST (OUTPATIENT)
Dept: HOSPICE | Facility: HOSPICE | Age: 73
End: 2018-05-31

## 2018-05-31 ENCOUNTER — HOME CARE/HOSPICE - HEALTHEAST (OUTPATIENT)
Dept: HOSPICE | Facility: HOSPICE | Age: 73
End: 2018-05-31

## 2018-06-01 ENCOUNTER — HOME CARE/HOSPICE - HEALTHEAST (OUTPATIENT)
Dept: HOSPICE | Facility: HOSPICE | Age: 73
End: 2018-06-01

## 2018-06-05 ENCOUNTER — HOME CARE/HOSPICE - HEALTHEAST (OUTPATIENT)
Dept: HOSPICE | Facility: HOSPICE | Age: 73
End: 2018-06-05

## 2018-06-06 ENCOUNTER — HOME CARE/HOSPICE - HEALTHEAST (OUTPATIENT)
Dept: HOSPICE | Facility: HOSPICE | Age: 73
End: 2018-06-06

## 2018-06-06 RX ORDER — PROCHLORPERAZINE MALEATE 10 MG
10 TABLET ORAL EVERY 6 HOURS PRN
Status: SHIPPED | COMMUNITY
Start: 2018-06-06

## 2018-06-08 ENCOUNTER — HOME CARE/HOSPICE - HEALTHEAST (OUTPATIENT)
Dept: HOSPICE | Facility: HOSPICE | Age: 73
End: 2018-06-08

## 2018-06-12 ENCOUNTER — HOME CARE/HOSPICE - HEALTHEAST (OUTPATIENT)
Dept: HOSPICE | Facility: HOSPICE | Age: 73
End: 2018-06-12

## 2018-06-13 ENCOUNTER — HOME CARE/HOSPICE - HEALTHEAST (OUTPATIENT)
Dept: HOSPICE | Facility: HOSPICE | Age: 73
End: 2018-06-13

## 2018-06-13 RX ORDER — METHADONE HYDROCHLORIDE 5 MG/1
5 TABLET ORAL
Status: SHIPPED | COMMUNITY
Start: 2018-06-13

## 2018-06-13 RX ORDER — PREDNISONE 20 MG/1
20 TABLET ORAL 2 TIMES DAILY
Status: SHIPPED | COMMUNITY
Start: 2018-06-20

## 2018-06-14 ENCOUNTER — AMBULATORY - HEALTHEAST (OUTPATIENT)
Dept: HOSPICE | Facility: HOSPICE | Age: 73
End: 2018-06-14

## 2018-06-15 ENCOUNTER — HOME CARE/HOSPICE - HEALTHEAST (OUTPATIENT)
Dept: HOSPICE | Facility: HOSPICE | Age: 73
End: 2018-06-15

## 2018-06-19 ENCOUNTER — HOME CARE/HOSPICE - HEALTHEAST (OUTPATIENT)
Dept: HOSPICE | Facility: HOSPICE | Age: 73
End: 2018-06-19

## 2018-06-20 ENCOUNTER — HOME CARE/HOSPICE - HEALTHEAST (OUTPATIENT)
Dept: HOSPICE | Facility: HOSPICE | Age: 73
End: 2018-06-20

## 2018-06-21 ENCOUNTER — HOME CARE/HOSPICE - HEALTHEAST (OUTPATIENT)
Dept: HOSPICE | Facility: HOSPICE | Age: 73
End: 2018-06-21

## 2018-06-22 ENCOUNTER — HOME CARE/HOSPICE - HEALTHEAST (OUTPATIENT)
Dept: HOSPICE | Facility: HOSPICE | Age: 73
End: 2018-06-22

## 2018-06-26 ENCOUNTER — HOME CARE/HOSPICE - HEALTHEAST (OUTPATIENT)
Dept: HOSPICE | Facility: HOSPICE | Age: 73
End: 2018-06-26

## 2018-06-27 ENCOUNTER — HOME CARE/HOSPICE - HEALTHEAST (OUTPATIENT)
Dept: HOSPICE | Facility: HOSPICE | Age: 73
End: 2018-06-27

## 2018-06-28 ENCOUNTER — AMBULATORY - HEALTHEAST (OUTPATIENT)
Dept: HOSPICE | Facility: HOSPICE | Age: 73
End: 2018-06-28

## 2018-06-29 ENCOUNTER — HOME CARE/HOSPICE - HEALTHEAST (OUTPATIENT)
Dept: HOSPICE | Facility: HOSPICE | Age: 73
End: 2018-06-29

## 2018-07-03 ENCOUNTER — HOME CARE/HOSPICE - HEALTHEAST (OUTPATIENT)
Dept: HOSPICE | Facility: HOSPICE | Age: 73
End: 2018-07-03

## 2018-07-05 ENCOUNTER — HOME CARE/HOSPICE - HEALTHEAST (OUTPATIENT)
Dept: HOSPICE | Facility: HOSPICE | Age: 73
End: 2018-07-05

## 2018-07-06 ENCOUNTER — HOME CARE/HOSPICE - HEALTHEAST (OUTPATIENT)
Dept: HOSPICE | Facility: HOSPICE | Age: 73
End: 2018-07-06

## 2018-07-10 ENCOUNTER — HOME CARE/HOSPICE - HEALTHEAST (OUTPATIENT)
Dept: HOSPICE | Facility: HOSPICE | Age: 73
End: 2018-07-10

## 2018-07-11 ENCOUNTER — HOME CARE/HOSPICE - HEALTHEAST (OUTPATIENT)
Dept: HOSPICE | Facility: HOSPICE | Age: 73
End: 2018-07-11

## 2018-07-12 ENCOUNTER — AMBULATORY - HEALTHEAST (OUTPATIENT)
Dept: HOSPICE | Facility: HOSPICE | Age: 73
End: 2018-07-12

## 2018-07-13 ENCOUNTER — HOME CARE/HOSPICE - HEALTHEAST (OUTPATIENT)
Dept: HOSPICE | Facility: HOSPICE | Age: 73
End: 2018-07-13

## 2018-07-17 ENCOUNTER — HOME CARE/HOSPICE - HEALTHEAST (OUTPATIENT)
Dept: HOSPICE | Facility: HOSPICE | Age: 73
End: 2018-07-17

## 2018-07-18 ENCOUNTER — HOME CARE/HOSPICE - HEALTHEAST (OUTPATIENT)
Dept: HOSPICE | Facility: HOSPICE | Age: 73
End: 2018-07-18

## 2018-07-19 ENCOUNTER — HOME CARE/HOSPICE - HEALTHEAST (OUTPATIENT)
Dept: HOSPICE | Facility: HOSPICE | Age: 73
End: 2018-07-19

## 2018-07-19 ENCOUNTER — AMBULATORY - HEALTHEAST (OUTPATIENT)
Dept: HOSPICE | Facility: HOSPICE | Age: 73
End: 2018-07-19

## 2018-07-20 ENCOUNTER — HOME CARE/HOSPICE - HEALTHEAST (OUTPATIENT)
Dept: HOSPICE | Facility: HOSPICE | Age: 73
End: 2018-07-20

## 2018-07-24 ENCOUNTER — HOME CARE/HOSPICE - HEALTHEAST (OUTPATIENT)
Dept: HOSPICE | Facility: HOSPICE | Age: 73
End: 2018-07-24

## 2018-07-25 ENCOUNTER — HOME CARE/HOSPICE - HEALTHEAST (OUTPATIENT)
Dept: HOSPICE | Facility: HOSPICE | Age: 73
End: 2018-07-25

## 2018-07-26 ENCOUNTER — AMBULATORY - HEALTHEAST (OUTPATIENT)
Dept: HOSPICE | Facility: HOSPICE | Age: 73
End: 2018-07-26

## 2018-07-27 ENCOUNTER — HOME CARE/HOSPICE - HEALTHEAST (OUTPATIENT)
Dept: HOSPICE | Facility: HOSPICE | Age: 73
End: 2018-07-27

## 2018-07-31 ENCOUNTER — HOME CARE/HOSPICE - HEALTHEAST (OUTPATIENT)
Dept: HOSPICE | Facility: HOSPICE | Age: 73
End: 2018-07-31

## 2018-08-01 ENCOUNTER — HOME CARE/HOSPICE - HEALTHEAST (OUTPATIENT)
Dept: HOSPICE | Facility: HOSPICE | Age: 73
End: 2018-08-01

## 2018-08-03 ENCOUNTER — HOME CARE/HOSPICE - HEALTHEAST (OUTPATIENT)
Dept: HOSPICE | Facility: HOSPICE | Age: 73
End: 2018-08-03

## 2018-08-07 ENCOUNTER — HOME CARE/HOSPICE - HEALTHEAST (OUTPATIENT)
Dept: HOSPICE | Facility: HOSPICE | Age: 73
End: 2018-08-07

## 2018-08-08 ENCOUNTER — HOME CARE/HOSPICE - HEALTHEAST (OUTPATIENT)
Dept: HOSPICE | Facility: HOSPICE | Age: 73
End: 2018-08-08

## 2018-08-09 ENCOUNTER — AMBULATORY - HEALTHEAST (OUTPATIENT)
Dept: HOSPICE | Facility: HOSPICE | Age: 73
End: 2018-08-09

## 2018-08-10 ENCOUNTER — HOME CARE/HOSPICE - HEALTHEAST (OUTPATIENT)
Dept: HOSPICE | Facility: HOSPICE | Age: 73
End: 2018-08-10

## 2018-08-13 ENCOUNTER — HOME CARE/HOSPICE - HEALTHEAST (OUTPATIENT)
Dept: HOSPICE | Facility: HOSPICE | Age: 73
End: 2018-08-13

## 2018-08-14 ENCOUNTER — HOME CARE/HOSPICE - HEALTHEAST (OUTPATIENT)
Dept: HOSPICE | Facility: HOSPICE | Age: 73
End: 2018-08-14

## 2018-08-15 ENCOUNTER — HOME CARE/HOSPICE - HEALTHEAST (OUTPATIENT)
Dept: HOSPICE | Facility: HOSPICE | Age: 73
End: 2018-08-15

## 2018-08-16 ENCOUNTER — HOME CARE/HOSPICE - HEALTHEAST (OUTPATIENT)
Dept: HOSPICE | Facility: HOSPICE | Age: 73
End: 2018-08-16

## 2018-08-17 ENCOUNTER — HOME CARE/HOSPICE - HEALTHEAST (OUTPATIENT)
Dept: HOSPICE | Facility: HOSPICE | Age: 73
End: 2018-08-17

## 2018-08-21 ENCOUNTER — HOME CARE/HOSPICE - HEALTHEAST (OUTPATIENT)
Dept: HOSPICE | Facility: HOSPICE | Age: 73
End: 2018-08-21

## 2018-08-22 ENCOUNTER — HOME CARE/HOSPICE - HEALTHEAST (OUTPATIENT)
Dept: HOSPICE | Facility: HOSPICE | Age: 73
End: 2018-08-22

## 2018-08-23 ENCOUNTER — AMBULATORY - HEALTHEAST (OUTPATIENT)
Dept: HOSPICE | Facility: HOSPICE | Age: 73
End: 2018-08-23

## 2018-08-24 ENCOUNTER — HOME CARE/HOSPICE - HEALTHEAST (OUTPATIENT)
Dept: HOSPICE | Facility: HOSPICE | Age: 73
End: 2018-08-24

## 2018-08-27 ENCOUNTER — HOME CARE/HOSPICE - HEALTHEAST (OUTPATIENT)
Dept: HOSPICE | Facility: HOSPICE | Age: 73
End: 2018-08-27

## 2018-08-28 ENCOUNTER — HOME CARE/HOSPICE - HEALTHEAST (OUTPATIENT)
Dept: HOSPICE | Facility: HOSPICE | Age: 73
End: 2018-08-28

## 2018-08-30 ENCOUNTER — HOME CARE/HOSPICE - HEALTHEAST (OUTPATIENT)
Dept: HOSPICE | Facility: HOSPICE | Age: 73
End: 2018-08-30

## 2018-08-31 ENCOUNTER — HOME CARE/HOSPICE - HEALTHEAST (OUTPATIENT)
Dept: HOSPICE | Facility: HOSPICE | Age: 73
End: 2018-08-31

## 2018-09-04 ENCOUNTER — HOME CARE/HOSPICE - HEALTHEAST (OUTPATIENT)
Dept: HOSPICE | Facility: HOSPICE | Age: 73
End: 2018-09-04

## 2018-09-05 ENCOUNTER — HOME CARE/HOSPICE - HEALTHEAST (OUTPATIENT)
Dept: HOSPICE | Facility: HOSPICE | Age: 73
End: 2018-09-05

## 2018-09-06 ENCOUNTER — AMBULATORY - HEALTHEAST (OUTPATIENT)
Dept: HOSPICE | Facility: HOSPICE | Age: 73
End: 2018-09-06

## 2018-09-07 ENCOUNTER — HOME CARE/HOSPICE - HEALTHEAST (OUTPATIENT)
Dept: HOSPICE | Facility: HOSPICE | Age: 73
End: 2018-09-07

## 2018-09-11 ENCOUNTER — HOME CARE/HOSPICE - HEALTHEAST (OUTPATIENT)
Dept: HOSPICE | Facility: HOSPICE | Age: 73
End: 2018-09-11

## 2018-09-12 ENCOUNTER — HOME CARE/HOSPICE - HEALTHEAST (OUTPATIENT)
Dept: HOSPICE | Facility: HOSPICE | Age: 73
End: 2018-09-12

## 2018-09-12 RX ORDER — HYDROMORPHONE HYDROCHLORIDE 1 MG/ML
2 SOLUTION ORAL
Status: SHIPPED | COMMUNITY
Start: 2018-09-12

## 2018-09-13 ENCOUNTER — HOME CARE/HOSPICE - HEALTHEAST (OUTPATIENT)
Dept: HOSPICE | Facility: HOSPICE | Age: 73
End: 2018-09-13

## 2018-09-14 ENCOUNTER — HOME CARE/HOSPICE - HEALTHEAST (OUTPATIENT)
Dept: HOSPICE | Facility: HOSPICE | Age: 73
End: 2018-09-14

## 2018-09-15 ENCOUNTER — HOME CARE/HOSPICE - HEALTHEAST (OUTPATIENT)
Dept: HOSPICE | Facility: HOSPICE | Age: 73
End: 2018-09-15

## 2018-09-17 ENCOUNTER — HOME CARE/HOSPICE - HEALTHEAST (OUTPATIENT)
Dept: HOSPICE | Facility: HOSPICE | Age: 73
End: 2018-09-17

## 2021-05-28 ENCOUNTER — RECORDS - HEALTHEAST (OUTPATIENT)
Dept: ADMINISTRATIVE | Facility: CLINIC | Age: 76
End: 2021-05-28

## 2021-05-29 ENCOUNTER — RECORDS - HEALTHEAST (OUTPATIENT)
Dept: ADMINISTRATIVE | Facility: CLINIC | Age: 76
End: 2021-05-29

## 2021-05-30 ENCOUNTER — RECORDS - HEALTHEAST (OUTPATIENT)
Dept: ADMINISTRATIVE | Facility: CLINIC | Age: 76
End: 2021-05-30

## 2021-05-30 VITALS — WEIGHT: 293 LBS | BODY MASS INDEX: 50.02 KG/M2 | HEIGHT: 64 IN

## 2021-05-30 VITALS — WEIGHT: 293 LBS | BODY MASS INDEX: 51.25 KG/M2

## 2021-05-31 VITALS — BODY MASS INDEX: 48.29 KG/M2 | WEIGHT: 281.3 LBS

## 2021-05-31 VITALS — WEIGHT: 273.9 LBS | BODY MASS INDEX: 47.01 KG/M2

## 2021-05-31 VITALS — BODY MASS INDEX: 46.36 KG/M2 | WEIGHT: 270.06 LBS

## 2021-05-31 VITALS — BODY MASS INDEX: 48.32 KG/M2 | HEIGHT: 64 IN | WEIGHT: 283 LBS

## 2021-05-31 VITALS — WEIGHT: 267.5 LBS | BODY MASS INDEX: 45.92 KG/M2

## 2021-05-31 VITALS — WEIGHT: 291.5 LBS | BODY MASS INDEX: 50.04 KG/M2

## 2021-05-31 VITALS — WEIGHT: 285.2 LBS | BODY MASS INDEX: 48.95 KG/M2

## 2021-06-01 VITALS — BODY MASS INDEX: 43.26 KG/M2 | WEIGHT: 252 LBS

## 2021-06-01 VITALS — WEIGHT: 230 LBS | BODY MASS INDEX: 39.48 KG/M2

## 2021-06-01 VITALS — BODY MASS INDEX: 37.42 KG/M2 | WEIGHT: 218 LBS

## 2021-06-01 VITALS — WEIGHT: 239 LBS | BODY MASS INDEX: 41.02 KG/M2

## 2021-06-01 VITALS — WEIGHT: 235 LBS | BODY MASS INDEX: 40.34 KG/M2

## 2021-06-01 VITALS — WEIGHT: 223 LBS | BODY MASS INDEX: 38.28 KG/M2

## 2021-06-01 VITALS — WEIGHT: 215 LBS | BODY MASS INDEX: 36.9 KG/M2

## 2021-06-01 VITALS — WEIGHT: 220 LBS | BODY MASS INDEX: 37.76 KG/M2

## 2021-06-01 VITALS — WEIGHT: 225 LBS | BODY MASS INDEX: 38.62 KG/M2

## 2021-06-01 VITALS — BODY MASS INDEX: 37.93 KG/M2 | WEIGHT: 221 LBS

## 2021-06-01 VITALS — BODY MASS INDEX: 40.85 KG/M2 | WEIGHT: 238 LBS

## 2021-06-01 VITALS — WEIGHT: 158 LBS | HEIGHT: 64 IN | BODY MASS INDEX: 26.98 KG/M2

## 2021-06-01 VITALS — WEIGHT: 243 LBS | BODY MASS INDEX: 41.71 KG/M2

## 2021-06-01 VITALS — WEIGHT: 226 LBS | BODY MASS INDEX: 38.79 KG/M2

## 2021-06-01 VITALS — BODY MASS INDEX: 42.4 KG/M2 | WEIGHT: 247 LBS

## 2021-06-01 VITALS — WEIGHT: 208 LBS | BODY MASS INDEX: 35.7 KG/M2

## 2021-06-01 VITALS — BODY MASS INDEX: 44.92 KG/M2 | WEIGHT: 261.7 LBS

## 2021-06-01 VITALS — WEIGHT: 227 LBS | BODY MASS INDEX: 38.96 KG/M2

## 2021-06-01 VITALS — BODY MASS INDEX: 38.79 KG/M2 | WEIGHT: 226 LBS

## 2021-06-01 VITALS — BODY MASS INDEX: 36.39 KG/M2 | WEIGHT: 212 LBS

## 2021-06-01 VITALS — BODY MASS INDEX: 45.83 KG/M2 | WEIGHT: 267 LBS

## 2021-06-01 VITALS — WEIGHT: 268.5 LBS | BODY MASS INDEX: 46.09 KG/M2

## 2021-06-01 VITALS — WEIGHT: 224 LBS | BODY MASS INDEX: 38.45 KG/M2

## 2021-06-02 VITALS — WEIGHT: 209 LBS | BODY MASS INDEX: 35.87 KG/M2

## 2021-06-08 NOTE — PROGRESS NOTES
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  We can safely continue with observation.  I will see her again in 2 months with repeat CT and lab work.    She will call if there is any worsening of pleuritic left-sided pain.    Transverse myelitis is stable.  Pain appears to have improved and she is using less Vicodin.    Plan: Follow-up in 2 months with repeat CT of the chest abdomen and pelvis and lab work    Measurable disease: CT of the chest     Current therapy: Observation     Treatment history: 3 cycles of maintenance Alimta, last August 2015     Carboplatin and Alimta for 6 cycles  Patient received cycle 1 with carboplatin and Alimta  She received carboplatin and Alimta and Avastin for 2 cycles     Lung cancer, upper lobe    Staging form: Lung, AJCC 7th Edition      Clinical: Stage IV (T2b, NX, M1b) - Signed by Laura Trejo CNP on 3/21/2016    ECOG Performance   ECOG Performance Status: 2    Distress Assessment  Distress Assessment Score: 2    Pain  Pain Score (Initial OR Reassessment): 5        Problem List    1. Malignant neoplasm of upper lobe of left lung  CT Chest Abdomen Pelvis With Oral With IV Cont    HM1 (CBC and Differential)    Comprehensive Metabolic Panel    HM1 (CBC with Diff)        CC: Lupe Mclean MD    ______________________________________________________________________________    History of Present Illness    Ms. Arianna Gregory is here for reevaluation.  She was seen 2 months ago.  She reports some occasional left-sided chest wall pain with a deep breath.  No  headaches or dizziness.  No cough or fever.  No new abdominal or bone pain.  Pain from transverse myelitis and diabetic neuropathies improved.  ECOG status remains 2.  No other changes.    Pain Status  Currently in Pain: Yes    Review of Systems    Constitutional  Constitutional (WDL): Exceptions to WDL  Fatigue: Fatigue relieved by rest  Neurosensory  Neurosensory (WDL): Exceptions to WDL  Ataxia: Asymptomatic, clinical or diagnostic observations only, intervention not indicated (Uses a walker.)  Peripheral Sensory Neuropathy: Asymptomatic, loss of deep tendon reflexes or paresthesia (Left hand and bilateral legs/feet; numbness and tingling.)  Cardiovascular  Cardiovascular (WDL): Exceptions to WDL  Edema: Yes (Legs.Wears compression socks.)  Pulmonary  Respiratory (WDL): Exceptions to WDL  Dyspnea: Shortness of breath with moderate exertion  Gastrointestinal  Gastrointestinal (WDL): Exceptions to WDL  Constipation: Occasional or intermittent symptoms, occasional use of stool softeners, laxatives, dietary modification, or enema  Genitourinary  Genitourinary (WDL): All genitourinary elements are within defined limits  Integumentary  Integumentary (WDL): All integumentary elements are within defined limits  Patient Coping  Patient Coping: Accepting  Distress Assessment  Distress Assessment Score: 2  Accompanied by  Accompanied by: Alone    Past History  Past Medical History   Diagnosis Date     Anxiety      Arthritis      Bilateral leg weakness      Bone spur of other site      on spine     Cancer      lungs      Cataracts, bilateral      Cellulitis of leg      Bilateral     Diabetes mellitus      MOELLER (dyspnea on exertion)      History of transfusion      Lung cancer      Lung mass      Mediastinal adenopathy      Metastatic cancer      Myelitis, acute transverse      On home oxygen therapy      prn at home, uses it when out     Sciatica      Spine pain      Venous stasis of lower extremity          Past Surgical  History   Procedure Laterality Date     Tonsillectomy       Tubal ligation       Eye surgery       lasix procedure     Cataract surgery Bilateral 2015     Breast biopsy Right      Thoracoscopy Left 1/14/2016     Procedure: LEFT THORACOSCOPY ;  Surgeon: Huber Bai MD;  Location: [facility];  Service:      Fracture surgery       Lung bx       EBUS     Pr insj tunneled ctr vad w/subq port age 6 yr/> Left 2/22/2016     Procedure: PORT PLACEMENT;  Surgeon: Huber Bai MD;  Location: [facility];  Service: General       Physical Exam    Recent Vitals 1/31/2017   Weight 298 lbs 10 oz   /62   Pulse 82   Temp 98   Temp src 1   SpO2 93       GENERAL: Alert and oriented. Seated comfortably. In no distress.    HEAD: Atraumatic and normocephalic.  Has a full head of hair.    EYES: KARINA, EOMI.  No pallor.  No icterus.    Oral cavity: no mucosal lesion or tonsillar enlargement.    NECK: supple. JVP normal.  No thyroid enlargement.    LYMPH NODES: No palpable, cervical, axillary or inguinal lymphadenopathy.    CHEST: clear to auscultation bilaterally.  Resonant to percussion throughout bilaterally.  Symmetrical breath movements bilaterally.    CVS: S1 and S2 are heard. Regular rate and rhythm.  No murmur or gallop or rub heard.    ABDOMEN: Soft. Not tender. Not distended.  No palpable hepatomegaly or splenomegaly.  No other mass palpable.  Bowel sounds heard.    EXTREMITIES: Warm.  No peripheral edema.    SKIN: no rash, or bruising or purpura.    CNS: Awake alert oriented ×3.  She has some lower extremity weakness which is stable.        Lab Results    Recent Results (from the past 168 hour(s))   POCT creatinine   Result Value Ref Range    POC Creatinine 0.9 mg/dL   POCT GFR   Result Value Ref Range    POC GFR AMER AF HE >60  >60 mL/min/1.73m2    POC GFR NON AMER AF >60  >60 mL/min/1.73m2   Comprehensive Metabolic Panel   Result Value Ref Range    Sodium 140 136 - 145 mmol/L    Potassium 4.0 3.5 -  5.0 mmol/L    Chloride 105 98 - 107 mmol/L    CO2 26 22 - 31 mmol/L    Anion Gap, Calculation 9 5 - 18 mmol/L    Glucose 126 (H) 70 - 125 mg/dL    BUN 14 8 - 28 mg/dL    Creatinine 0.75 0.60 - 1.10 mg/dL    GFR MDRD Af Amer >60 >60 mL/min/1.73m2    GFR MDRD Non Af Amer >60 >60 mL/min/1.73m2    Bilirubin, Total 0.8 0.0 - 1.0 mg/dL    Calcium 9.1 8.5 - 10.5 mg/dL    Protein, Total 7.3 6.0 - 8.0 g/dL    Albumin 3.7 3.5 - 5.0 g/dL    Alkaline Phosphatase 116 45 - 120 U/L    AST 29 0 - 40 U/L    ALT 22 0 - 45 U/L   HM1 (CBC with Diff)   Result Value Ref Range    WBC 6.3 4.0 - 11.0 thou/uL    RBC 4.46 3.80 - 5.40 mill/uL    Hemoglobin 13.7 12.0 - 16.0 g/dL    Hematocrit 40.9 35.0 - 47.0 %    MCV 92 80 - 100 fL    MCH 30.8 27.0 - 34.0 pg    MCHC 33.5 32.0 - 36.0 g/dL    RDW 14.9 (H) 11.0 - 14.5 %    Platelets 182 140 - 440 thou/uL    MPV 7.2 7.0 - 10.0 fL    Neutrophils % 67 50 - 70 %    Lymphocytes % 21 20 - 40 %    Monocytes % 8 2 - 10 %    Eosinophils % 4 0 - 6 %    Basophils % 0 0 - 2 %    Neutrophils Absolute 4.2 2.0 - 7.7 thou/uL    Lymphocytes Absolute 1.3 0.8 - 4.4 thou/uL    Monocytes Absolute 0.5 0.0 - 0.9 thou/uL    Eosinophils Absolute 0.2 0.0 - 0.4 thou/uL    Basophils Absolute 0.0 0.0 - 0.2 thou/uL       Imaging    Ct Chest Abdomen Pelvis With Oral With Iv Cont    Result Date: 1/27/2017  CT CHEST, ABDOMEN, AND PELVIS 1/27/2017 10:47 AM      INDICATION: fu lung cancer TECHNIQUE: CT chest, abdomen, and pelvis. Dose reduction techniques were used. IV CONTRAST: Iohexol (Omni) 100 mL COMPARISON: CT of the torso 11/28/2016, 9/2/2016, and 7/1/2016. PET/CT 1/28/2016. FINDINGS: CHEST: Left upper lobe mass and surrounding consolidation presumably explained by treatment related pneumonitis not appreciably changed from 11/28/2016. Overall, the abnormality measures about 7.2 x 4.0 cm on axial images. Mild pleural nodularity on the left which was previously FDG avid and consistent with metastatic disease is stable. The  largest pleural-based nodule of the lingula is unchanged at 8 mm (e.G. series 3, image 32). Numerous tiny parenchymal nodules throughout both lungs, more numerous on  the left, are also stable. No pathologically enlarged thoracic lymph nodes.  ABDOMEN: Hepatic steatosis. Slightly nodular contour and lobar redistribution of the liver suggestive of cirrhosis. Cholelithiasis. Spleen, pancreas, and adrenal glands negative. Bilateral nonobstructive nephrolithiasis not significantly changed. PELVIS: No free fluid or lymphadenopathy. Urinary bladder, uterus, adnexa, appendix, and rectum negative. MUSCULOSKELETAL: Degenerative changes of the spine. No concerning lytic or blastic bone lesions.     CONCLUSION: 1.  Stable appearance of the torso. No change in treated left upper lobe neoplasm and surrounding presumed pneumonitis. Stable metastatic pleural nodularity on the left. 2.  Numerous tiny parenchymal nodules throughout both lungs, left more the right, are also unchanged. Differential diagnosis includes infectious and inflammatory etiologies in addition to metastatic disease. 3.  Hepatic steatosis and findings suggestive of cirrhosis. 4.  Cholelithiasis. 5.  Bilateral nonobstructive nephrolithiasis.        Signed by: Aniket Patel MD

## 2021-06-09 NOTE — PROGRESS NOTES
Patient arrived ambulatory, by self, assisted by walker, for port lab draw.  Port accessed under aseptic technique - excellent blood return noted.  Blood drawn for labs.  Port flushed with NS.  Heparin instilled and needle dced.  Dressing applied.  Left ambulatory, by self, assisted by walker.

## 2021-06-09 NOTE — PROGRESS NOTES
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  Adenopathy and other pulmonary nodules are also stable.  Patient remains asymptomatic.  Therefore we will continue with observation and plan on follow-up with repeat scans again in 2 months.    Plan: Follow-up in 2 months with repeat CT of the chest abdomen and pelvis      Measurable disease: CT of the chest     Current therapy: Observation     Treatment history: 3 cycles of maintenance Alimta, last August 2016     Carboplatin and Alimta for 6 cycles  Patient received cycle 1 with carboplatin and Alimta  She received carboplatin and Alimta and Avastin for 2 cycles     Lung cancer, upper lobe    Staging form: Lung, AJCC 7th Edition      Clinical: Stage IV (T2b, NX, M1b) - Signed by Laura Trejo CNP on 3/21/2016    ECOG Performance   ECOG Performance Status: 1    Distress Assessment  Distress Assessment Score: No distress    Pain  Pain Score (Initial OR Reassessment): 3        Problem List    1. Malignant neoplasm of upper lobe of left lung  CT Chest Abdomen Pelvis With Oral With IV Cont    Pathology Additional Testing        CC: Lupe Mclean MD    ______________________________________________________________________________    History of Present Illness    Ms. Arianna Gregory is here for reevaluation.  She was seen in January.  She has been off treatment since last August.  She describes some nasal congestion.  No change with her breathing, no cough, no chest wall pain.  She denies any other new abdominal or bone pain.  ECOG status remains at 2.      Pain Status  Currently in  Pain: Yes    Review of Systems    Constitutional  Constitutional (WDL): Exceptions to WDL  Fatigue: Fatigue relieved by rest  Neurosensory  Neurosensory (WDL): Exceptions to WDL  Peripheral Motor Neuropathy: Asymptomatic, clinical or diagnostic observations only, intervention not indicated (hands & feet)  Ataxia: Asymptomatic, clinical or diagnostic observations only, intervention not indicated (walks with wheeled walker)  Peripheral Sensory Neuropathy: Asymptomatic, loss of deep tendon reflexes or paresthesia (hands & feet)  Cardiovascular  Cardiovascular (WDL): Exceptions to WDL  Edema: Yes  Edema Limbs: 5 - 10% inter-limb discrepancy in volume or circumference at point of greatest visible difference, swelling or obscuration of anatomic architecture on close inspection (bilateral LE)  Pulmonary  Respiratory (WDL): Exceptions to WDL (nasal congestion)  Cough: Mild symptoms, nonprescription intervention indicated  Dyspnea: Shortness of breath with moderate exertion  Gastrointestinal  Gastrointestinal (WDL): Exceptions to WDL  Constipation: Occasional or intermittent symptoms, occasional use of stool softeners, laxatives, dietary modification, or enema (on stool softener)  Genitourinary  Genitourinary (WDL): Exceptions to WDL (nocturia x 2-3)  Urinary Frequency: Present (on lasix)  Integumentary  Integumentary (WDL): All integumentary elements are within defined limits  Patient Coping  Patient Coping: Accepting  Distress Assessment  Distress Assessment Score: No distress  Accompanied by  Accompanied by: Alone    Past History  Past Medical History:   Diagnosis Date     Anxiety      Arthritis      Bilateral leg weakness      Bone spur of other site     on spine     Cancer     lungs      Cataracts, bilateral      Cellulitis of leg     Bilateral     Diabetes mellitus      MOELLER (dyspnea on exertion)      History of transfusion      Lung cancer      Lung mass      Mediastinal adenopathy      Metastatic cancer      Myelitis,  "acute transverse      On home oxygen therapy     prn at home, uses it when out     Sciatica      Spine pain      Venous stasis of lower extremity          Past Surgical History:   Procedure Laterality Date     BREAST BIOPSY Right      cataract surgery Bilateral 2015     EYE SURGERY      lasix procedure     FRACTURE SURGERY       Lung bx      EBUS     IN INSJ TUNNELED CTR VAD W/SUBQ PORT AGE 6 YR/> Left 2/22/2016    Procedure: PORT PLACEMENT;  Surgeon: Huber Bai MD;  Location: [facility];  Service: General     THORACOSCOPY Left 1/14/2016    Procedure: LEFT THORACOSCOPY ;  Surgeon: Huber Bai MD;  Location: [facility];  Service:      TONSILLECTOMY       TUBAL LIGATION         Physical Exam    Recent Vitals 3/30/2017   Height 5' 4\"   Weight 296 lbs 8 oz   BSA (m2) 2.46 m2   /70   Pulse 88   Temp 98.1   Temp src 1   SpO2 92       GENERAL: Alert and oriented. Seated comfortably. In no distress.    HEAD: Atraumatic and normocephalic.  Has a full head of hair.    EYES: KARINA, EOMI.  No pallor.  No icterus.    Oral cavity: no mucosal lesion or tonsillar enlargement.    NECK: supple. JVP normal.  No thyroid enlargement.    LYMPH NODES: No palpable, cervical, axillary or inguinal lymphadenopathy.    CHEST: clear to auscultation bilaterally.  Resonant to percussion throughout bilaterally.  Symmetrical breath movements bilaterally.    CVS: S1 and S2 are heard. Regular rate and rhythm.  No murmur or gallop or rub heard.    ABDOMEN: Soft. Not tender. Not distended.  No palpable hepatomegaly or splenomegaly.  No other mass palpable.  Bowel sounds heard.    EXTREMITIES: Warm.  No peripheral edema.    SKIN: no rash, or bruising or purpura.    CNS: Awake alert oriented ×3.  She has some lower extremity weakness which is stable.        Lab Results    Recent Results (from the past 168 hour(s))   POCT creatinine   Result Value Ref Range    POC Creatinine 0.6 mg/dL   POCT GFR   Result Value Ref Range "    POC GFR AMER AF HE >60  >60 mL/min/1.73m2    POC GFR NON AMER AF >60  >60 mL/min/1.73m2   Comprehensive Metabolic Panel   Result Value Ref Range    Sodium 140 136 - 145 mmol/L    Potassium 4.1 3.5 - 5.0 mmol/L    Chloride 106 98 - 107 mmol/L    CO2 28 22 - 31 mmol/L    Anion Gap, Calculation 6 5 - 18 mmol/L    Glucose 123 70 - 125 mg/dL    BUN 11 8 - 28 mg/dL    Creatinine 0.77 0.60 - 1.10 mg/dL    GFR MDRD Af Amer >60 >60 mL/min/1.73m2    GFR MDRD Non Af Amer >60 >60 mL/min/1.73m2    Bilirubin, Total 0.8 0.0 - 1.0 mg/dL    Calcium 9.4 8.5 - 10.5 mg/dL    Protein, Total 7.6 6.0 - 8.0 g/dL    Albumin 4.0 3.5 - 5.0 g/dL    Alkaline Phosphatase 137 (H) 45 - 120 U/L    AST 35 0 - 40 U/L    ALT 26 0 - 45 U/L   HM1 (CBC with Diff)   Result Value Ref Range    WBC 5.6 4.0 - 11.0 thou/uL    RBC 4.34 3.80 - 5.40 mill/uL    Hemoglobin 13.6 12.0 - 16.0 g/dL    Hematocrit 41.2 35.0 - 47.0 %    MCV 95 80 - 100 fL    MCH 31.3 27.0 - 34.0 pg    MCHC 33.0 32.0 - 36.0 g/dL    RDW 13.5 11.0 - 14.5 %    Platelets 161 140 - 440 thou/uL    MPV 9.3 8.5 - 12.5 fL    Neutrophils % 69 50 - 70 %    Lymphocytes % 20 20 - 40 %    Monocytes % 8 2 - 10 %    Eosinophils % 3 0 - 6 %    Basophils % 1 0 - 2 %    Neutrophils Absolute 3.8 2.0 - 7.7 thou/uL    Lymphocytes Absolute 1.1 0.8 - 4.4 thou/uL    Monocytes Absolute 0.4 0.0 - 0.9 thou/uL    Eosinophils Absolute 0.2 0.0 - 0.4 thou/uL    Basophils Absolute 0.0 0.0 - 0.2 thou/uL       Imaging    Ct Chest Abdomen Pelvis With Oral With Iv Cont    Result Date: 3/29/2017  CT CHEST, ABDOMEN, AND PELVIS 3/29/2017 11:48 AM      INDICATION: Followup lung cancer TECHNIQUE: CT chest, abdomen, and pelvis. Dose reduction techniques were used. IV CONTRAST: Iohexol (Omni) 100 mL. COMPARISON: 01/27/2017. FINDINGS: CHEST: A mass is again seen in the left upper lobe with adjacent groundglass opacity presumed to be pneumonitis. The area involved measures 7 x 3.4 cm on series 3, image 25, slightly decreased  compared to the prior exam. The central masslike opacity on series 2, image 24 measures 3.1 x 2.7 x 3.2 cm and previously measured 3.5 x 3 x 3.6 cm. Small nodules are again seen in both lungs most prominent in the left lower lobe. They do not appear to be significantly changed in size or number and measure between 2 and 6 mm in diameter. Nodular pleural and the disease on the left is similarly stable. A pretracheal lymph node on series 2, image 30 measures 1.3 x 1.5 cm, unchanged. Other mildly enlarged mediastinal lymph nodes are similarly stable. Calcified atherosclerotic plaques again seen in the coronary arteries.  ABDOMEN: Images compromised by respiratory motion. Cholelithiasis. The liver is diffusely mildly low in attenuation consistent with fatty infiltration. The liver appears small with a lobular contour suggesting cirrhosis. The adrenal glands, spleen, and pancreas appear grossly normal. Nonobstructing calculi are seen in both kidneys. The abdominal aorta is normal in caliber with moderate calcified atherosclerotic plaque. The appendix is visualized and appears normal. PELVIS: There is no evidence of free air, free fluid, or fluid collection. Calcified atherosclerotic plaque is seen in the pelvic arteries. The uterus and ovaries appear normal. Enlarged lymph nodes are seen in the pelvis bilaterally. One external iliac lymph node on the right measures 4.8 x 1.1 cm on series 2, image 166, unchanged from the prior exam. A left external iliac chain lymph node measures 3.7 x 1 cm on series 2, image 166, unchanged from the prior exam. MUSCULOSKELETAL: Degenerative changes in the lumbar spine and scoliosis.     CONCLUSION: 1.  Slight interval decrease in size of left lung mass and associated consolidation. Metastatic tiny pulmonary nodules and left pleural nodularity are unchanged. 2.  Fatty infiltration of the liver with possible cirrhosis. 3.  Cholelithiasis. 4.  Nephrolithiasis. 5.  Pelvic lymphadenopathy,  unchanged.        Signed by: Aniket Patel MD

## 2021-06-10 NOTE — PROGRESS NOTES
Homecare order has [facility] to [facility].  She has used [facility] in the past but they no longer have homecare services in the [facility].  Patient was made aware of the changes and is comfortable with the plan.    Nicole Damico RN 5/26/17 [id number]

## 2021-06-11 NOTE — PROGRESS NOTES
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  Patient remains asymptomatic.  We discussed options of continued observation versus resuming treatment.    Discussed options of restarting Alimta therapy or consideration for Nivolumab.    After discussion patient is comfortable with continued observation as she is asymptomatic.  She would also like to have current issues with leg edema and discharge to be resolved before she starts chemotherapy.    Plan: Follow-up in 2 months with repeat CT of the chest abdomen and pelvis and lab work      Measurable disease: CT of the chest     Current therapy: Observation     Treatment history: 3 cycles of maintenance Alimta, last August 2016     Carboplatin and Alimta for 6 cycles  Patient received cycle 1 with carboplatin and Alimta  She received carboplatin and Alimta and Avastin for 2 cycles     Lung cancer, upper lobe    Staging form: Lung, AJCC 7th Edition      Clinical: Stage IV (T2b, NX, M1b) - Signed by Laura Trejo CNP on 3/21/2016    ECOG Performance   ECOG Performance Status: 2    Distress Assessment  Distress Assessment Score: No distress    Pain  Pain Score (Initial OR Reassessment): 3        Problem List    1. Malignant neoplasm of upper lobe of left lung  CT Chest Abdomen Pelvis With Oral With IV Cont    HM1(CBC and  Differential)    Comprehensive Metabolic Panel        CC: Lupe Mclean MD    ______________________________________________________________________________    History of Present Illness    Ms. Arianna Gregory is here for reevaluation.  She was seen 2 months ago.  She has had some increased lower extremity edema and discharge for which she is being seen now by home health.  She has not had any fever and is not on antibiotics.    No new headaches dizziness or focal weakness.  No change with breathing.  No new chest wall pain or cough.  No other new symptoms.  Continues to have lower extremity pain related to transverse myelitis and neuropathy.  ECOG status remains at 2.    Pain Status  Currently in Pain: Yes    Review of Systems    Constitutional  Constitutional (WDL): Exceptions to WDL  Fatigue: Fatigue not relieved by rest - Limiting instrumental ADL  Weight Loss: to <10% from baseline, intervention not indicated (Down 5 lbs since last office visit.)  Neurosensory  Neurosensory (WDL): Exceptions to WDL  Peripheral Motor Neuropathy: Asymptomatic, clinical or diagnostic observations only, intervention not indicated  Ataxia: Asymptomatic, clinical or diagnostic observations only, intervention not indicated (Walks with wheeled walker.)  Peripheral Sensory Neuropathy: Asymptomatic, loss of deep tendon reflexes or paresthesia (Hands and feet.)  Cardiovascular  Cardiovascular (WDL): Exceptions to WDL  Edema: Yes  Edema Limbs: Grade 2 (Getting assistance from home care.)  Pulmonary  Respiratory (WDL): Exceptions to WDL  Dyspnea: Shortness of breath with minimal exertion, limiting instrumental ADL  Gastrointestinal  Gastrointestinal (WDL): Exceptions to WDL  Anorexia: Loss of appetite without alteration in eating habits (Gets amor faster. )  Genitourinary  Genitourinary (WDL): Exceptions to WDL  Urinary Frequency: Present (On Lasix)  Integumentary  Integumentary (WDL): All integumentary elements are within defined  limits  Patient Coping  Patient Coping: Accepting  Distress Assessment  Distress Assessment Score: No distress  Accompanied by  Accompanied by: Alone    Past History  Past Medical History:   Diagnosis Date     Anxiety      Arthritis      Bilateral leg weakness      Bone spur of other site     on spine     Cancer     lungs      Cataracts, bilateral      Cellulitis of leg     Bilateral     Diabetes mellitus      MOELLER (dyspnea on exertion)      History of transfusion      Lung cancer      Lung mass      Mediastinal adenopathy      Metastatic cancer      Myelitis, acute transverse      On home oxygen therapy     prn at home, uses it when out     Sciatica      Spine pain      Venous stasis of lower extremity          Past Surgical History:   Procedure Laterality Date     BREAST BIOPSY Right      cataract surgery Bilateral 2015     EYE SURGERY      lasix procedure     FRACTURE SURGERY       Lung bx      EBUS     IN INSJ TUNNELED CTR VAD W/SUBQ PORT AGE 5 YR/> Left 2/22/2016    Procedure: PORT PLACEMENT;  Surgeon: Huber Bai MD;  Location: [facility];  Service: General     THORACOSCOPY Left 1/14/2016    Procedure: LEFT THORACOSCOPY ;  Surgeon: Huber Bai MD;  Location: [facility];  Service:      TONSILLECTOMY       TUBAL LIGATION         Physical Exam    Recent Vitals 6/1/2017   Weight 291 lbs 8 oz   /63   Pulse 81   Temp 97.6   Temp src 1   SpO2 93       GENERAL: Alert and oriented. Seated comfortably. In no distress.    HEAD: Atraumatic and normocephalic.  Has a full head of hair.    EYES: KARINA, EOMI.  No pallor.  No icterus.    Oral cavity: no mucosal lesion or tonsillar enlargement.    NECK: supple. JVP normal.  No thyroid enlargement.    LYMPH NODES: No palpable, cervical, axillary or inguinal lymphadenopathy.    CHEST: clear to auscultation bilaterally.  Resonant to percussion throughout bilaterally.  Symmetrical breath movements bilaterally.    CVS: S1 and S2 are heard. Regular  rate and rhythm.  No murmur or gallop or rub heard.    ABDOMEN: Soft. Not tender. Not distended.  No palpable hepatomegaly or splenomegaly.  No other mass palpable.  Bowel sounds heard.    EXTREMITIES: Warm.  No peripheral edema.    SKIN: no rash, or bruising or purpura.    CNS: Awake alert oriented ×3.  She has some lower extremity weakness which is stable.        Lab Results    Recent Results (from the past 168 hour(s))   POCT GFR   Result Value Ref Range    POC GFR AMER AF HE >60  >60 mL/min/1.73m2    POC GFR NON AMER AF >60  >60 mL/min/1.73m2   POCT creatinine   Result Value Ref Range    POC Creatinine 0.7 mg/dL       Imaging    Ct Chest Abdomen Pelvis With Oral With Iv Cont    Result Date: 5/30/2017  CT CHEST, ABDOMEN, AND PELVIS 5/30/2017 12:17 PM      INDICATION: Stage IV adenocarcinoma of the lung with pleural metastases and carcinomatosis. Assess response to interval chemotherapy. TECHNIQUE: CT chest, abdomen, and pelvis. Dose reduction techniques were used. IV CONTRAST: Iohexol (Omni) 100 mL. COMPARISON: 03/29/2017 and 01/27/2017. FINDINGS: CHEST: Mass left upper lobe with surrounding consolidation. When measured in the same location the discrete solid mass in the left upper lobe (image 25 of series 3) measures 3.2 cm transverse, 2.9 cm AP and 3.3 cm craniocaudal (coronal image 119) compared with 3.1 x 2.7 x 3.2 cm. Consolidation with air bronchograms in the lung parenchyma posterior to the discrete mass has not changed significantly. Small foci of groundglass density in the aerated left upper lobe anterior to the discrete mass unchanged as well. Multiple nodules throughout the left lower lobe and right lung appear to be stable in size and number. However, the pleural metastases in the left hemithorax have progressed. For example in the anterior medial left hemithorax there is an area of pleural metastatic deposition that measures about 1.1 cm transverse (image 36 of series 2) which was not present  previously. In addition in the anterior mediastinum (image 35 of series 2) there is a 3 x 1 cm area of tissue that has developed. Left anterior mediastinal lymph node 1.5 x 1.0 cm, previously 1.4 x 0.8 cm (image 29 of series 2). Precarinal mediastinal lymph node with central fatty hilum (image 28 of series 2) stable 1.5 x 1.3 cm. Subcarinal lymph node (image 37 of series 2) 1.7 x 1.2 cm, previously 1.4 x 0.8 cm.  ABDOMEN: Moderate steatosis and contour irregularity (indicating some degree of steatohepatitis and/or fibrosis) and slight motion artifact. 12 mm, 4 mm and 3 mm nonobstructing right renal stones and 5 mm nonobstructing left renal stone again seen. Cholelithiasis. Liver, spleen, pancreas, kidneys, adrenal glands, gallbladder, bile ducts, and abdominal aorta are otherwise negative. PELVIS: Prominent right and left external iliac lymph nodes unchanged. Small intestine, colon, appendix, ureters, bladder, uterus and adnexa otherwise negative. MUSCULOSKELETAL: Severe spondylotic change lumbar spine. No bone lesions identified. Bones appear demineralized.     CONCLUSION: 1.  Pleural metastases left hemithorax and mediastinal disease have progress. 2.  Left upper lobe mass with surrounding consolidation and bilateral pulmonary nodules stable. 3.  Moderate steatosis and contour irregularity (indicating some degree of steatohepatitis and/or fibrosis) and slight motion artifact decreased sensitivity of liver lesion detection. No liver lesion identified. 4.  Gallstones and renal stones.        Signed by: Aniket Patel MD

## 2021-06-12 NOTE — PROGRESS NOTES
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  For now we will continue with observation and see patient again in 3 months with recheck of labs.    Patient is noted with new anemia which may explain increased shortness of breath.  Denies any bleeding.  Reports that her diet has been poor the last couple of months.  Will do anemia workup today recheck of her to count, iron studies B12 and folate.  Will recheck CBC when she returns in 3 months      Plan: Follow-up in 3 months with repeat CT of the chest abdomen and pelvis and lab work  Anemia workup      Measurable disease: CT of the chest     Current therapy: Observation     Treatment history: 3 cycles of maintenance Alimta, last August 2016     Carboplatin and Alimta for 6 cycles  Patient received cycle 1 with carboplatin and Alimta  She received carboplatin and Alimta and Avastin for 2 cycles     Lung cancer, upper lobe    Staging form: Lung, AJCC 7th Edition      Clinical: Stage IV (T2b, NX, M1b) - Signed by Laura Trejo CNP on 3/21/2016    ECOG Performance   ECOG Performance Status: 2    Distress Assessment  Distress Assessment Score: No distress    Pain  Pain Score (Initial OR Reassessment): 7        Problem List    1. Malignant neoplasm of upper lobe of left lung  Glycosylated Hemoglobin A1C    Reticulocytes    Ferritin    Iron and Transferrin Iron  Binding Capacity    Vitamin B12    Folate, Serum    CT Chest Abdomen Pelvis Without Oral With IV Contrast    HM1(CBC and Differential)   2. Hypoglycemia   Glycosylated Hemoglobin A1C        CC: Lupe Mclean MD    ______________________________________________________________________________    History of Present Illness    Ms. Arianna Gregory is here for reevaluation.  She was seen 2 months ago.  Has noted more pain in her legs.  Denies headache.  Has had some increased shortness of breath with exertion.  Denies bleeding.  Reports poor diet.  No previous history of anemia.  No previous history of iron treatment.  ECOG is 2.      Pain Status  Currently in Pain: Yes    Review of Systems    Constitutional  Constitutional (WDL): Exceptions to WDL  Fatigue: Fatigue not relieved by rest - Limiting instrumental ADL  Weight Loss: to <10% from baseline, intervention not indicated (Down 6 lbs since last office visit. )  Neurosensory  Neurosensory (WDL): Exceptions to WDL  Ataxia: Asymptomatic, clinical or diagnostic observations only, intervention not indicated (Walks with wheeled walker.)  Peripheral Sensory Neuropathy: Asymptomatic, loss of deep tendon reflexes or paresthesia (Feet.)  Cardiovascular  Cardiovascular (WDL): Exceptions to WDL  Edema: Yes (Legs. It's better now. Home care has discharged her.)  Pulmonary  Respiratory (WDL): Exceptions to WDL  Cough: Moderate symptoms, medical intervention indicated, limiting instrumental ADL  Dyspnea: Shortness of breath with minimal exertion, limiting instrumental ADL  Gastrointestinal  Gastrointestinal (WDL): Exceptions to WDL  Anorexia: Loss of appetite without alteration in eating habits  Constipation: Occasional or intermittent symptoms, occasional use of stool softeners, laxatives, dietary modification, or enema  Nausea: Loss of appetite without alteration in eating habits  Genitourinary  Genitourinary (WDL): Exceptions to WDL  Urinary Frequency:  Present  Integumentary  Integumentary (WDL): All integumentary elements are within defined limits  Patient Coping  Patient Coping: Accepting  Distress Assessment  Distress Assessment Score: No distress  Accompanied by  Accompanied by: Alone    Past History  Past Medical History:   Diagnosis Date     Anxiety      Arthritis      Bilateral leg weakness      Bone spur of other site     on spine     Cancer     lungs      Cataracts, bilateral      Cellulitis of leg     Bilateral     Diabetes mellitus      MOELLER (dyspnea on exertion)      History of transfusion      Lung cancer      Lung mass      Mediastinal adenopathy      Metastatic cancer      Myelitis, acute transverse      On home oxygen therapy     prn at home, uses it when out     Sciatica      Spine pain      Venous stasis of lower extremity          Past Surgical History:   Procedure Laterality Date     BREAST BIOPSY Right      cataract surgery Bilateral 2015     EYE SURGERY      lasix procedure     FRACTURE SURGERY       Lung bx      EBUS     [facility] INSJ TUNNELED CTR VAD W/SUBQ PORT AGE 6 YR/> Left 2/22/2016    Procedure: PORT PLACEMENT;  Surgeon: Huber Bai MD;  Location: [facility];  Service: General     THORACOSCOPY Left 1/14/2016    Procedure: LEFT THORACOSCOPY ;  Surgeon: Huber Bai MD;  Location: [facility];  Service:      TONSILLECTOMY       TUBAL LIGATION         Physical Exam    Recent Vitals 8/1/2017   Weight 285 lbs 3 oz   /64   Pulse 88   Temp 98   Temp src 1   SpO2 90   Some recent data might be hidden       GENERAL: Alert and oriented. Seated comfortably. In no distress.    HEAD: Atraumatic and normocephalic.  Has a full head of hair.    EYES: KARINA, EOMI.  No pallor.  No icterus.    Oral cavity: no mucosal lesion or tonsillar enlargement.    NECK: supple. JVP normal.  No thyroid enlargement.    LYMPH NODES: No palpable, cervical, axillary or inguinal lymphadenopathy.    CHEST: clear to auscultation bilaterally but  decreased air entry  Resonant to percussion throughout bilaterally.  Symmetrical breath movements bilaterally.    CVS: S1 and S2 are heard. Regular rate and rhythm.  No murmur or gallop or rub heard.    ABDOMEN: Soft. Not tender. Not distended.  No palpable hepatomegaly or splenomegaly.  No other mass palpable.  Bowel sounds heard.    EXTREMITIES: Warm.  No peripheral edema.    SKIN: no rash, or bruising or purpura.    CNS: Awake alert oriented ×3.  She has some lower extremity weakness which is stable.        Lab Results    Recent Results (from the past 168 hour(s))   POCT creatinine   Result Value Ref Range    POC Creatinine 0.6 mg/dL   POCT GFR   Result Value Ref Range    POC GFR AMER AF HE >60  >60 mL/min/1.73m2    POC GFR NON AMER AF >60  >60 mL/min/1.73m2   Comprehensive Metabolic Panel   Result Value Ref Range    Sodium 141 136 - 145 mmol/L    Potassium 3.9 3.5 - 5.0 mmol/L    Chloride 104 98 - 107 mmol/L    CO2 25 22 - 31 mmol/L    Anion Gap, Calculation 12 5 - 18 mmol/L    Glucose 113 70 - 125 mg/dL    BUN 14 8 - 28 mg/dL    Creatinine 0.80 0.60 - 1.10 mg/dL    GFR MDRD Af Amer >60 >60 mL/min/1.73m2    GFR MDRD Non Af Amer >60 >60 mL/min/1.73m2    Bilirubin, Total 1.5 (H) 0.0 - 1.0 mg/dL    Calcium 9.7 8.5 - 10.5 mg/dL    Protein, Total 7.2 6.0 - 8.0 g/dL    Albumin 3.8 3.5 - 5.0 g/dL    Alkaline Phosphatase 127 (H) 45 - 120 U/L    AST 28 0 - 40 U/L    ALT 12 0 - 45 U/L   HM1 (CBC with Diff)   Result Value Ref Range    WBC 5.4 4.0 - 11.0 thou/uL    RBC 3.11 (L) 3.80 - 5.40 mill/uL    Hemoglobin 9.1 (L) 12.0 - 16.0 g/dL    Hematocrit 29.6 (L) 35.0 - 47.0 %    MCV 95 80 - 100 fL    MCH 29.3 27.0 - 34.0 pg    MCHC 30.7 (L) 32.0 - 36.0 g/dL    RDW 13.8 11.0 - 14.5 %    Platelets 220 140 - 440 thou/uL    MPV 9.1 8.5 - 12.5 fL    Neutrophils % 69 50 - 70 %    Lymphocytes % 21 20 - 40 %    Monocytes % 7 2 - 10 %    Eosinophils % 4 0 - 6 %    Basophils % 0 0 - 2 %    Neutrophils Absolute 3.7 2.0 - 7.7 thou/uL     Lymphocytes Absolute 1.1 0.8 - 4.4 thou/uL    Monocytes Absolute 0.4 0.0 - 0.9 thou/uL    Eosinophils Absolute 0.2 0.0 - 0.4 thou/uL    Basophils Absolute 0.0 0.0 - 0.2 thou/uL       Imaging    Ct Chest Abdomen Pelvis With Oral With Iv Cont    Result Date: 7/28/2017  CT CHEST, ABDOMEN, AND PELVIS 7/28/2017 12:23 PM      INDICATION: Follow-up lung cancer. TECHNIQUE: CT chest, abdomen, and pelvis. Dose reduction techniques were used. IV CONTRAST: Iohexol (Omni) 100 mL COMPARISON: CT chest, abdomen and pelvis 05/30/2017 FINDINGS: CHEST: No significant change in the size or appearance of the left upper lobe lung mass measuring 3.3 x 2.8 cm in image 24 of series 2. Extensive adjacent consolidated lung within the left upper lobe with air bronchograms, unchanged. Innumerable nodules throughout both lungs, most marked within the lower lobes, nearly all unchanged. One of the more discrete nodules within the right upper lobe is slightly larger measuring 10 mm on image 23, previously measuring 7 mm. Pleural-based nodularity within the left upper lobe and along the left pericardium and para-aortic mediastinum, unchanged. Enlarged left-sided and subcarinal mediastinal nodes as seen previously.  ABDOMEN: Diffuse fatty infiltration of the liver. Irregular contour of the liver margin suggesting cirrhosis. The spleen, pancreas, and adrenal glands are unremarkable. Multiple gallstones. Numerous nonobstructing stones right kidney, the largest measuring 1.3 cm. Single nonobstructing stone lower pole left kidney measures 6 mm. Subcentimeter lymph nodes in the retroperitoneum. Atherosclerotic disease of aorta. PELVIS: Right and left external iliac chain adenopathy is unchanged. No ascites. Scattered diverticula sigmoid colon. MUSCULOSKELETAL: Degenerative disc and facet disease lumbar spine. No concerning lytic or blastic lesions.     CONCLUSION: 1.  Left upper lobe mass and surrounding consolidation, unchanged. 2.  Innumerable pulmonary  nodules throughout both lungs, most of which are unchanged. One of the nodules in the right upper lobe is slightly larger. 3.  No change enlarged mediastinal lymph nodes. 4.  No evidence for metastatic disease within the abdomen or pelvis. 5.  Cirrhosis. 6.  Gallstones. 7.  Nonobstructing stones both kidneys.        Signed by: Aniket Patel MD

## 2021-06-12 NOTE — PROGRESS NOTES
Subjective: Arianna is a 72-year-old lady who is here for several issues.  She is currently being followed at hematology oncology for diagnosis of lung cancer.  Her last visit is reviewed at this time.  She had a hemoglobin of 9.1 on 8/1/2017.  This is a drop from her previous level of 13.6.  She had an iron workup which showed low iron levels.  Her oncologist placed her on ferrous sulfate which she is continuing to take.  This was checked due to her new complaints of shortness of breath.  He felt that this was possibly contributing to her shortness of breath.  She is only been on the ferrous sulfate for about 2 weeks.  She has not noticed any difference as far as her shortness of breath.  She would actually like to discontinue a couple of her medications.  She is wondering about discontinuing the clonidine.  This was started for blood pressure and she has very good tracking of her blood pressure showing that her home blood pressures are excellent.  Her blood pressures on coming in for lab work or any appointments ago which she has historically had happened.  Secondly she is wondering about decreasing further her Lasix.  She presently is on a 40 mg tablet and takes half of this every other day or sometimes even only 3 times a week.  This was started for venous stasis and she states that decreasing it as she has done has not seemed to make a difference to her swelling.  However she does have to urinate very frequently which is somewhat disconcerting to her.  So she is wondering about decreasing this further.  Third she is wondering about discontinuing her metformin.  This was started when she had an elevation of her hemoglobin A1c really earlier on after her diagnosis.  Her last A1c level on 8/3/2017 was at a level of 5.3.  She does not keep track of her blood sugars at this time.  She states the main thing that is bothering her is not the lung cancer as that does not cause her any pain but rather her legs.  This is  bilateral and is from about mid calf down through her feet.  She is attributing this to her history of transverse myelitis which is of long-standing.  At one point I had tried to schedule her at the pain clinic but this never happened.  Patient Active Problem List   Diagnosis     Bilateral leg weakness     Hyperglycemia     Leg weakness, bilateral     MOELLER (dyspnea on exertion)     Lower extremity venous stasis     Mediastinal adenopathy     Lung cancer, upper lobe     Carcinomatosis     Chronic pain     Current Outpatient Prescriptions on File Prior to Visit   Medication Sig Dispense Refill     acetaminophen (TYLENOL) 325 MG tablet Take 650 mg by mouth daily as needed for pain (very cautious while taking the vicodin. Takes MAYBE 4 tabs in 1 week.).       cloNIDine HCl (CATAPRES) 0.1 MG tablet TAKE 1 TABLET BY MOUTH DAILY 30 tablet 0     docusate sodium (COLACE) 100 MG capsule Take 100 mg by mouth 2 (two) times a day.       ferrous sulfate 325 (65 FE) MG tablet Take 1 tablet (325 mg total) by mouth 2 (two) times a day. 60 tablet 3     furosemide (LASIX) 40 MG tablet Take 1 tablet (40 mg total) by mouth daily. 90 tablet 3     HYDROcodone-acetaminophen 5-325 mg per tablet Take 1 tablet by mouth every 4 (four) hours as needed for pain. 180 tablet 0     melatonin 10 mg Tab Take 1 tablet by mouth at bedtime.       metFORMIN (GLUCOPHAGE) 500 MG tablet Take 1 tablet (500 mg total) by mouth 2 (two) times a day with meals. 180 tablet 3     naproxen sodium (ALEVE) 220 MG tablet Take 220 mg by mouth every 12 (twelve) hours.        potassium chloride SA (K-DUR,KLOR-CON) 20 MEQ tablet TAKE TWO TABLETS BY MOUTH DAILY 180 tablet 1     [DISCONTINUED] diclofenac sodium (VOLTAREN) 1 % Gel Test small area first. If tolerated, use small amt on affected area TID prn 100 g 3     [DISCONTINUED] folic acid (FOLVITE) 1 MG tablet Take 1 mg by mouth daily.       Current Facility-Administered Medications on File Prior to Visit   Medication Dose  Route Frequency Provider Last Rate Last Dose     heparin 100 unit/mL lockflush (PF) porcine 300-600 Units  300-600 Units Intravenous PRN Aniket Patel MD   600 Units at 09/06/16 1521     Social History     Social History     Marital status:      Spouse name: N/A     Number of children: N/A     Years of education: N/A     Occupational History     Not on file.     Social History Main Topics     Smoking status: Former Smoker     Packs/day: 1.50     Years: 45.00     Quit date: 1/1/2011     Smokeless tobacco: Never Used     Alcohol use No      Comment: Quit 2011     Drug use: No     Sexual activity: No     Other Topics Concern     Not on file     Social History Narrative     Cancer-related family history is not on file.  Family history is not relevant for this patient's problems    Objective: Vital signs: Blood pressure is 122/62.  This lady is alert she does not appear in any acute distress and actually is in very good spirits.  Skin color is good.  Lungs are clear to auscultation without wheezes rales rhonchi.  Heart regular rate and rhythm S1-S2 normal without murmurs or ectopy.  Legs she has support stockings on no obvious edema above this.  Neuro she is clear and intact today.  Oriented ×3 speech appropriate.    Assessment: #1 stage IV lung cancer which has been stable over the last year  2.  Nausea which is of more recent onset in which she is attributing to her pain level in her feet  3.  Shortness of breath-she is also attributing this to her pain level.  4.  Transverse myelitis which is causing more pain for her at this time  5.  Venous stasis which may be contributing to the pain in her lower extremities  6.  Abnormal A1c but really at a prediabetic level at present rather than true diabetes    Plan: #1 we will DC clonidine.  She does take her blood pressure at home and I think it is an accurate reading so she can follow this if this starts to go up she will call and we will resume the  antihypertensive  2.  Will cut Lasix back to 20 mg tablets prescription and sent in  3.  Will DC potassium and this to be checked at her next visit  4.  Will DC metformin and follow A1c's  5.  I discussed with her referring her to the spine clinic to really manage better her myelitis and this referral was placed.

## 2021-06-13 NOTE — PROGRESS NOTES
Assessment:   Arianna Gregory is a 72 y.o. y.o. female with past medical history significant for Lung cancer, pre-diabetes, irregular heart rate, hypertension, transverse myelitis who presents today for bilateral lower extremity burning, numbness/tingling pain that is likely from peripheral neuropathy secondary to her chemotherapy treatments for lung cancer.  She has continued to have chronic low back pain though this has been managed with longterm opiates prescribed by her primary care physician.  The patient has diminished vibratory sensation (unable to test proprioception secondary to the patient wearing compression stockings) consistent with small fiber peripheral neuropathy and her symptoms are concordant with expected symptoms from peripheral neuropathy.  She continues to have chronic low back pain with history of previous right sciatica secondary to bilateral L5-S1 foraminal stenosis though the sciatica has been resolved since 2015.   She is without any red flag symptoms.         Plan:     A shared decision making plan was used.  The patient's values and choices were respected.  The following represents what was discussed and decided upon by the physician and the patient.      1.  DIAGNOSTIC TESTS: No diagnostic tests are necessary at this time.  An EMG would not change the treatment plan at this time and an EMG would be very technically difficult secondary to the patient's lymphedema in her legs.    2.  PHYSICAL THERAPY: She was offered a repeat course of physical therapy but she declines at this time.  3.  MEDICATIONS: The patient was offered Lyrica.  She would like to discuss this with her primary care physician first.  She can either have this prescribed by her primary care physician or by Dr. Aquino.  Would recommend Lyrica 50 mg p.o. twice daily ×7 days then increasing to Lyrica 50 mg 3 times a day.  If this dose is not effective and she does not have side effects, she could be increased to Lyrica   mg 3 times a day.  -Patient stated that she was not sure she wanted to take Lyrica with hydrocodone.  Discussed with the patient that the Lyrica is a much better medication to take for the leg burning/stinging pain than the hydrocodone is.  Patient says that she will discuss this with Dr. Mclean.  -Discussed that if she does not want to take Lyrica or she has side effects to Lyrica she could potentially try a tricyclic antidepressant, though she is not a good candidate for this medication given her history of irregular heart rate.  Tricyclic antidepressants can cause cardiac arrhythmias in elderly patients.  Would recommend an updated EKG if this is going to be prescribed.  Dr. Aquino will prefer if the primary care physician prescribed this if Lyrica is not an option.  4.  INTERVENTIONS: Patient asked if there are any injections that could help with this type of pain.  Discussed that her pain is not sciatica type pain and thus there are no injections that can help with peripheral neuropathy.  If her sciatica returns, lumbar epidural steroid injections could be repeated.  5.  PATIENT EDUCATION:    -Discussed with the patient that acupuncture can also be helpful for managing peripheral neuropathy pain, particularly from chemotherapy.  She is encouraged to contact her insurance company to see if acupuncture is a covered benefit.  If it is, referral could be given.  If it is not covered, she could still have acupuncture but she would have to pay out of pocket.  Patient prefers to discuss this treatment modality with her primary care physician.  6.  FOLLOW-UP: There is navigation is asked to contact the patient in 1 week determine what her preferences moving forward.  The patient is welcome to call the clinic if she has any updates prior to the nurse navigation team calling her.    Subjective:     Arianna Gregory is a 72 y.o. female who presents today for follow-up regarding a new complaint of bilateral lower  extremity pain/burning.  The patient was last seen in our clinic on December 23, 2015.  At that time she underwent an epidural steroid injection for sciatica type symptoms.  She reports that that has been under good control since that time.  She says that her back pain has not gotten any worse, though it is not any better.  She says that she is essentially given up on her back pain since that time.  She has a TENS unit which does provide some mild relief.  She is not interested in doing physical therapy as she has done several rounds of physical therapy without significant relief.  She continues to do a home exercise program.  She reminds me that she had a radiofrequency ablation procedure in 2009 which actually made her back pain worse.  She currently takes hydrocodone 3-4 tablets per day prescribed by her primary care physician which does help to manage her pain.  Next    She is here today for bilateral lower extremity pain that she describes as a burning, stinging, numbness/tingling type sensation.  She reports that this pain is different than her previous sciatica pain.  It is worse in the toes and the feet going up to just below the calf.  She then gets more mild symptoms from the calf up to the knee.  There are no symptoms above the knee.  This pain has been present since 2005, but she reports that it has been significantly worse since July of this year.  The symptoms tend to be worse with fatigue/lack of sleep, lifting, cooking.  She reports that nothing really makes it better.  She reminds me that she has been on gabapentin in the past but she did not like it secondary to blurred vision.  She states that she does not like to take multiple pain medications.    She reminds me that she has lung cancer and she has undergone chemotherapy.  She underwent lung surgery in early 2016.  She was hospitalized in May 2016 and then transferred to transitional care unit for venous stasis.    Medications: Viewed and correct  in the chart.    Allergies: Reviewed and the patient reports that ibuprofen causes swelling.    Past medical history: Reviewed and significant for lung cancer, prediabetes, irregular heartbeat, hypertension.    Past surgical history: Lung surgery, cataract removal, E bus.    Family history: Reviewed and significant for breast cancer, heart disease.  Social history: Patient quit smoking in January 2011.  She also quit drinking alcohol in January 2011.  She denies any illicit drug use.  The patient is  and retired.    Review of Systems:  Reviewed and significant for shortness of breath, poor sleep quality, indigestion, back pain, leg pain.  Otherwise 13 systems reviewed today are negative.  Please see the patient intake questionnaire from today's date for details.     Objective:   CONSTITUTIONAL:  Vital signs as above.  No acute distress.  The patient is well nourished and well groomed.    PSYCHIATRIC:  The patient is awake, alert, oriented to person, place and time.  The patient is answering questions appropriately with clear speech.  Normal affect.  SKIN:  Skin over the face, posterior torso, bilateral upper and lower extremities is clean, dry, intact without rashes.  MUSCULOSKELETAL: She can transfer independently.  She becomes quite short of breath with transferring.  Mild tenderness over the bilateral lower lumbar paraspinal muscles.      The patient has 5/5 strength for the bilateral hip flexors, knee flexors/extensors, ankle dorsiflexors/plantar flexors, ankle evertors/invertors.    NEUROLOGICAL: Trace patellar, medial hamstring, achilles reflexes which are symmetric bilaterally.  No ankle clonus bilaterally.  Sensation to light touch is intact in the bilateral L4, L5, and S1 dermatomes.  Patient has absent vibratory sensation to both the metatarsal heads bilaterally and the medial malleoli bilaterally.  She does have slight vibratory sensation in the calf, but only for approximately 4 seconds  bilaterally.  Equivocal Babinski's.  No ankle clonus.  Vascular: The patient does have significant pitting edema in the bilateral lower extremities.  She is wearing compression stockings which are not removed for exam today, as they would be extremely difficult to put back on.     RESULTS: His MRI report is reviewed.  The patient has significant bilateral L5-S1 foraminal stenosis.  Please refer the report for details.

## 2021-06-13 NOTE — PROGRESS NOTES
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  Discussed options for treatment with the patient.  We have decided to resume Alimta at 50% of full dose.  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  She will get a B12 injection today.  She will start folic acid 1 mg p.o. daily today.  She has a prescription for dexamethasone for 3 days starting the day before chemotherapy as well.  She will return in 1 week to receive first dose of Alimta at 50% dose reduction.  Will have her return 10 days later with lab work for a toxicity check.  Will increase dose of Alimta with cycle 2 she tolerates and plan restaging scans after 2 cycles.    I again reviewed potential side effects of Alimta with which she is familiar.  She understands and is willing to proceed and did sign informed consent.  She understands fever precautions.  She will use Zofran for nausea.  Treatment is with palliative intent.    Discussed alternate options of Nivolumab as immunotherapy for her cancer.  For now we will go with the Alimta.    40 minutes spent majority in counseling and coordination of care.    Plan: Vitamin B12 injection today  Start folic acid 1 mg p.o. daily today  Dexamethasone starting day before chemotherapy  Return November 10 for first cycle of Alimta at 50% of dose  Follow-up November 20 with labs for toxicity check      Measurable disease: CT of  the chest     Current therapy: Observation     Treatment history: 3 cycles of maintenance Alimta, last August 2016     Carboplatin and Alimta for 6 cycles  Patient received cycle 1 with carboplatin and Alimta  She received carboplatin and Alimta and Avastin for 2 cycles     Lung cancer, upper lobe    Staging form: Lung, AJCC 7th Edition      Clinical: Stage IV (T2b, NX, M1b) - Signed by Laura Trejo CNP on 3/21/2016    ECOG Performance   ECOG Performance Status: 1    Distress Assessment  Distress Assessment Score: 3    Pain  Pain Score (Initial OR Reassessment): 6        Problem List    1. Malignant neoplasm of upper lobe of left lung  IR Port Placement 5+ Years    cyanocobalamin injection 1,000 mcg    Education (Chemo Class)    Injection Appointment    dexamethasone (DECADRON) 4 MG tablet    folic acid (FOLVITE) 1 MG tablet    CC OFFICE VISIT LONG    Infusion Appointment        CC: Lupe Mclean MD    ______________________________________________________________________________    History of Present Illness    Ms. Arianna Gregory is here for reevaluation.  She was seen about 3 months ago.  She has had some more chest congestion.  She has occasional nausea.  She did go to the pain clinic because of her chronic pain.  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  She is now on Lyrica with better control of her pain.  No other new problems.  ECOG status is 2.        Pain Status  Currently in Pain: Yes    Review of Systems    Constitutional  Constitutional (WDL): Exceptions to WDL  Fatigue: Fatigue relieved by rest  Fever: None  Chills: None  Weight Gain: None  Weight Loss: None (2# 9/27/17)  Neurosensory  Neurosensory (WDL): Exceptions to WDL  Peripheral Motor Neuropathy: None  Ataxia: Asymptomatic, clinical or diagnostic observations only, intervention not indicated (uses walker)  Peripheral Sensory Neuropathy: Asymptomatic, loss of deep tendon reflexes or paresthesia (left  hand and arm and feet-chronic)  Confusion: None  Syncope: None  Cardiovascular  Cardiovascular (WDL): Exceptions to WDL  Palpitations: Definition: A disorder characterized by inflammation of the muscle tissue of the heart.  Edema: Yes (bilat LE)  Phlebitis: None  Superficial thrombophlebitis: None  Pulmonary  Respiratory (WDL): Exceptions to WDL  Cough: Mild symptoms, nonprescription intervention indicated (phelgm-clear sometimes with blood )  Dyspnea: Shortness of breath with minimal exertion, limiting instrumental ADL  Hypoxia: None (does have oxygen at home, if needs)  Gastrointestinal  Gastrointestinal (WDL): Exceptions to WDL  Anorexia: Loss of appetite without alteration in eating habits  Constipation: None (controlled with stool softners)  Diarrhea: None  Dysphagia: None  Esophagitis: None  Nausea: Loss of appetite without alteration in eating habits (intermittent)  Pharyngitis: None  Vomiting: None  Dysgeusia: Altered taste but no change in diet (foods taste different)  Dry Mouth: Symptomatic (e.g., dry or thick saliva) without significant dietary alteration, unstimulated saliva flow >0.2 ml/min  Genitourinary  Genitourinary (WDL): All genitourinary elements are within defined limits  Integumentary  Integumentary (WDL): All integumentary elements are within defined limits  Patient Coping  Patient Coping: Accepting  Distress Assessment  Distress Assessment Score: 3  Accompanied by  Accompanied by: Alone    Past History  Past Medical History:   Diagnosis Date     Anxiety      Arthritis      Bilateral leg weakness      Bone spur of other site     on spine     Cancer     lungs      Cataracts, bilateral      Cellulitis of leg     Bilateral     Diabetes mellitus      MOELLER (dyspnea on exertion)      History of transfusion      Lung cancer      Lung mass      Mediastinal adenopathy      Metastatic cancer      Myelitis, acute transverse      On home oxygen therapy     prn at home, uses it when out     Sciatica       Spine pain      Venous stasis of lower extremity          Past Surgical History:   Procedure Laterality Date     BREAST BIOPSY Right      cataract surgery Bilateral 2015     EYE SURGERY      lasix procedure     FRACTURE SURGERY       Lung bx      EBUS     TX INSJ TUNNELED CTR VAD W/SUBQ PORT AGE 5 YR/> Left 2/22/2016    Procedure: PORT PLACEMENT;  Surgeon: Huber Bai MD;  Location: [facility];  Service: General     THORACOSCOPY Left 1/14/2016    Procedure: LEFT THORACOSCOPY ;  Surgeon: Huber Bai MD;  Location: [facility];  Service:      TONSILLECTOMY       TUBAL LIGATION         Physical Exam    Recent Vitals 11/3/2017   Height -   Weight 281 lbs 5 oz   BSA (m2) -   /65   Pulse 78   Temp 98.5   Temp src 1   SpO2 89   Some recent data might be hidden       GENERAL: Alert and oriented. Seated comfortably. In no distress.    HEAD: Atraumatic and normocephalic.  Has a full head of hair.    EYES: KARINA, EOMI.  No pallor.  No icterus.    Oral cavity: no mucosal lesion or tonsillar enlargement.    NECK: supple. JVP normal.  No thyroid enlargement.    LYMPH NODES: No palpable, cervical, axillary or inguinal lymphadenopathy.    CHEST: clear to auscultation bilaterally but decreased air entry  Resonant to percussion throughout bilaterally.  Symmetrical breath movements bilaterally.    CVS: S1 and S2 are heard. Regular rate and rhythm.  No murmur or gallop or rub heard.    ABDOMEN: Soft. Not tender. Not distended.  No palpable hepatomegaly or splenomegaly.  No other mass palpable.  Bowel sounds heard.    EXTREMITIES: Warm.  No peripheral edema.    SKIN: no rash, or bruising or purpura.    CNS: Awake alert oriented ×3.  She has some lower extremity weakness which is stable.        Lab Results    Recent Results (from the past 168 hour(s))   POCT creatinine   Result Value Ref Range    POC Creatinine 0.7 mg/dL   POCT GFR   Result Value Ref Range    POC GFR AMER AF HE >60  >60 mL/min/1.73m2     POC GFR NON AMER AF >60  >60 mL/min/1.73m2   HM1 (CBC with Diff)   Result Value Ref Range    WBC 5.2 4.0 - 11.0 thou/uL    RBC 4.46 3.80 - 5.40 mill/uL    Hemoglobin 14.0 12.0 - 16.0 g/dL    Hematocrit 44.3 35.0 - 47.0 %    MCV 99 80 - 100 fL    MCH 31.4 27.0 - 34.0 pg    MCHC 31.6 (L) 32.0 - 36.0 g/dL    RDW 14.6 (H) 11.0 - 14.5 %    Platelets 180 140 - 440 thou/uL    MPV 9.5 8.5 - 12.5 fL    Neutrophils % 69 50 - 70 %    Lymphocytes % 20 20 - 40 %    Monocytes % 7 2 - 10 %    Eosinophils % 3 0 - 6 %    Basophils % 1 0 - 2 %    Neutrophils Absolute 3.5 2.0 - 7.7 thou/uL    Lymphocytes Absolute 1.1 0.8 - 4.4 thou/uL    Monocytes Absolute 0.4 0.0 - 0.9 thou/uL    Eosinophils Absolute 0.2 0.0 - 0.4 thou/uL    Basophils Absolute 0.0 0.0 - 0.2 thou/uL       Imaging    Ct Chest Abdomen Pelvis With Oral With Iv Cont    Result Date: 11/1/2017  CT CHEST, ABDOMEN, AND PELVIS 11/1/2017 1:22 PM      INDICATION: Malignant neoplasm of upper lobe of left lung. TECHNIQUE: CT chest, abdomen, and pelvis. Dose reduction techniques were used. IV CONTRAST: Iohexol (Omni) 100 mL. COMPARISON: 7/28/2017 and 5/30/2017. FINDINGS: CHEST: Left upper lobe lung mass 3.6 x 3.3 cm (image 32 of series 2) previously 3.3 x 2.8 cm. Progressive consolidation throughout the apicoposterior segment left upper lobe and also some progression although to a lesser degree in the anterior segment left upper lobe. Innumerable bilateral discrete pulmonary nodules have progressed. For example, pulmonary nodule in the apical segment right upper lobe (image 23 of series 3) 2.0 cm previously 1.0 cm. In addition, soft tissue density thickening and nodularity following a bronchovascular distribution in the basilar segments left lower lobe have progressed, which may indicate lymphangitic spread. Pleural metastases manifest as mild confluent nodular soft tissue density thickening throughout the left hemithorax have increased. Area of soft tissue thickening anterior  mediastinum (image 43 of series 2) 3.5 x 1.3 cm previously 3.0 x 1.0 cm. Mild stable mediastinal lymph node enlargement. Left subclavian central venous catheter tip mid SVC.  ABDOMEN: Liver has an irregular contour consistent with cirrhosis. No focal liver lesions identified. Mild splenic enlargement. Cholelithiasis. Nonobstructing renal stones bilaterally. Liver, spleen, pancreas, kidneys, adrenal glands, gallbladder, bile ducts, and abdominal aorta are otherwise negative. PELVIS: Prominent iliac lymph nodes stable. Small intestine, colon, appendix, ureters, bladder, uterus and adnexa otherwise negative. MUSCULOSKELETAL: Bones appear demineralized. No bone lesions. Severe multilevel degenerative disc disease lumbar spine.     CONCLUSION: 1.  Left upper lobe mass has increased in size and there has been progression of consolidation throughout the left upper lobe, bilateral pulmonary nodules, bronchovascular/lymphangitic metastases in the left lower lobe, mediastinal disease and left pleural metastases. 2.  Liver has a cirrhotic configuration. 3.  Cholelithiasis. 4.  Nonobstructing kidney stones bilaterally.        Signed by: Aniket Patel MD

## 2021-06-14 NOTE — PROGRESS NOTES
Pt came into infusion clinic for Day 1, Cycle 2 of her treatment Labs Reviewed. Medications explained to pt who verbalized understanding. Port patent throughout infusion. Pt tolerated infusion with no complications. Pt left infusion clinic via ambulatory.

## 2021-06-14 NOTE — PROGRESS NOTES
[facility] received call that patient will need home O2. Called Andi back and she said the patient currently has a concentrator at home, but would like to switch to [facility] for O2 equipment.   After speaking with the patient, I found out she has had oxygen through [employer] for the last 2 years. Due to the fact that she is 2 years into the billing cycle, [facility] is unable to accept her as a new/transfer oxygen patient.   Called [employer] to let them know their patient is discharging and will need a port delivered bedside, and a swap out of equipment for a system that can go up to 6LPM.    Per [employer], the will deliver a port within 4 hours bedside and will meet patient at home to exchange equipment. All records faxed to [facility] at 5PM.  Spoke with patients nurse, and the patient herself to let them know the ETA of the port, and that she will have to stay with [facility] for her oxygen needs. Patient understood.     [facility] contact info: [phone]      12/13/17 837PM - [facility] received call from [facility] that 1 E-tank was delivered around 730, and the  has been waiting outside the patients apartment door for about an hour. He has tried knocking on the door, and calling the number provided and has not gotten answer. He said that he did let the patient know at the time of discharge that the tank would only last for so long at 6lpm and he would meet her at home ASAP to swap out her equipment. Called the unit the patient was on and they said she called he preferred [employer] company and left.  907- Tried to call the patients number to see if I could get an answer, she did answer the phone and was at home with the  from [employer]. It took the cab company over an hour to pick the patient up and get her home. Spoke with the  and he was concerned that the patient was left for so long when they didn't know when the cab would come. Patients equipment is in the process of being exchanged  to accommodate 6lpm.

## 2021-06-14 NOTE — PROGRESS NOTES
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  Changes seen on scan before her hospitalization were probably due to infection as these have improved as well.  Patient has recovered well and we will proceed with cycle #3 of Alimta today at 50% of full dose.  She will return again in 3 weeks for cycle #4 after which we can plan another staging CT scan.  She knows to continue folic acid daily.        Plan: As above      Measurable disease: CT of the chest     Current therapy: Alimta today is day 1 cycle 3     Treatment history: 3 cycles of maintenance Alimta, last August 2016     Carboplatin and Alimta for 6 cycles  Patient received cycle 1 with carboplatin and Alimta  She received carboplatin and Alimta and Avastin for 2 cycles     Lung cancer, upper lobe    Staging form: Lung, AJCC 7th Edition      Clinical: Stage IV (T2b, NX, M1b) - Signed by Laura Trejo CNP on 3/21/2016    ECOG Performance   ECOG Performance Status: 2    Distress Assessment  Distress Assessment Score: No distress    Pain  Pain Score (Initial OR Reassessment): 6        Problem List    1. Lung cancer, upper lobe     2. Malignant neoplasm of upper lobe of left lung  CC OFFICE VISIT LONG    Infusion Appointment        CC: Lupe Mclean,  MD    ______________________________________________________________________________    History of Present Illness    Ms. Arianna Gregory is here for reevaluation.  She completed a second cycle of Alimta for recurrence of cancer 3 weeks ago.  She was hospitalized about 10 days ago with congestion, hypoxia and cough and treated for ammonia with improvement.  Initial scans were concerning for progression of her disease.  She has had repeat CT scans which show resolution of infiltrates and overall improvement in her cancer.  She is fatigued but otherwise doing well and is close to her baseline.  ECOG status is 2.      Pain Status  Currently in Pain: Yes    Review of Systems    Constitutional  Constitutional (WDL): Exceptions to WDL  Fatigue: Fatigue not relieved by rest - Limiting instrumental ADL  Neurosensory  Neurosensory (WDL): Exceptions to WDL  Peripheral Motor Neuropathy: Asymptomatic, clinical or diagnostic observations only, intervention not indicated  Ataxia: Moderate symptoms, limiting instrumental ADL (Walker)  Peripheral Sensory Neuropathy: Severe symptoms, limiting self care ADL  Cardiovascular  Cardiovascular (WDL): Exceptions to WDL  Edema: Yes  Edema Limbs: 5 - 10% inter-limb discrepancy in volume or circumference at point of greatest visible difference, swelling or obscuration of anatomic architecture on close inspection (Rafa LEs)  Pulmonary  Respiratory (WDL): Exceptions to WDL (Using O2 throughout the day. )  Cough: Mild symptoms, nonprescription intervention indicated  Dyspnea: Shortness of breath with moderate exertion  Gastrointestinal  Gastrointestinal (WDL): Exceptions to WDL  Anorexia: Loss of appetite without alteration in eating habits  Nausea: Loss of appetite without alteration in eating habits (A little more lately.)  Dry Mouth: Symptomatic (e.g., dry or thick saliva) without significant dietary alteration, unstimulated saliva flow >0.2 ml/min  Genitourinary  Genitourinary (WDL): All  genitourinary elements are within defined limits  Integumentary  Integumentary (WDL): All integumentary elements are within defined limits  Patient Coping  Patient Coping: Accepting  Distress Assessment  Distress Assessment Score: No distress  Accompanied by  Accompanied by: Alone    Past History  Past Medical History:   Diagnosis Date     Anxiety      Arthritis      Bilateral leg weakness      Bone spur of other site     on spine     Cancer     lungs      Cataracts, bilateral      Cellulitis of leg     Bilateral     Diabetes mellitus      MOELLER (dyspnea on exertion)      History of transfusion      Lung cancer      Lung mass      Mediastinal adenopathy      Metastatic cancer      Myelitis, acute transverse      On home oxygen therapy     prn at home, uses it when out     Sciatica      Spine pain      Venous stasis of lower extremity          Past Surgical History:   Procedure Laterality Date     BREAST BIOPSY Right      cataract surgery Bilateral 2015     EYE SURGERY      lasix procedure     FRACTURE SURGERY       Lung bx      EBUS     RI INSJ TUNNELED CTR VAD W/SUBQ PORT AGE 6 YR/> Left 2/22/2016    Procedure: PORT PLACEMENT;  Surgeon: Huber Bai MD;  Location: [facility];  Service: General     THORACOSCOPY Left 1/14/2016    Procedure: LEFT THORACOSCOPY ;  Surgeon: Huber Bai MD;  Location: [facility];  Service:      TONSILLECTOMY       TUBAL LIGATION         Physical Exam    Recent Vitals 12/22/2017   Height -   Weight 267 lbs 8 oz   BSA (m2) -   /70   Pulse 84   Temp 98.3   Temp src 1   SpO2 97   Some recent data might be hidden       GENERAL: Alert and oriented. Seated comfortably. In no distress.    HEAD: Atraumatic and normocephalic.  Has a full head of hair.    EYES: KARINA, EOMI.  No pallor.  No icterus.    Oral cavity: no mucosal lesion or tonsillar enlargement.    NECK: supple. JVP normal.  No thyroid enlargement.    LYMPH NODES: No palpable, cervical, axillary or inguinal  lymphadenopathy.    CHEST: clear to auscultation bilaterally but decreased air entry  Resonant to percussion throughout bilaterally.  Symmetrical breath movements bilaterally.    CVS: S1 and S2 are heard. Regular rate and rhythm.  No murmur or gallop or rub heard.    ABDOMEN: Soft. Not tender. Not distended.  No palpable hepatomegaly or splenomegaly.  No other mass palpable.  Bowel sounds heard.    EXTREMITIES: Warm.  No peripheral edema.    SKIN: no rash, or bruising or purpura.    CNS: Awake alert oriented ×3.  She has some lower extremity weakness which is stable.        Lab Results    Recent Results (from the past 168 hour(s))   Comprehensive Metabolic Panel   Result Value Ref Range    Sodium 143 136 - 145 mmol/L    Potassium 4.0 3.5 - 5.0 mmol/L    Chloride 101 98 - 107 mmol/L    CO2 30 22 - 31 mmol/L    Anion Gap, Calculation 12 5 - 18 mmol/L    Glucose 152 (H) 70 - 125 mg/dL    BUN 9 8 - 28 mg/dL    Creatinine 0.71 0.60 - 1.10 mg/dL    GFR MDRD Af Amer >60 >60 mL/min/1.73m2    GFR MDRD Non Af Amer >60 >60 mL/min/1.73m2    Bilirubin, Total 1.6 (H) 0.0 - 1.0 mg/dL    Calcium 9.6 8.5 - 10.5 mg/dL    Protein, Total 7.6 6.0 - 8.0 g/dL    Albumin 3.4 (L) 3.5 - 5.0 g/dL    Alkaline Phosphatase 148 (H) 45 - 120 U/L    AST 23 0 - 40 U/L    ALT 16 0 - 45 U/L   HM1 (CBC with Diff)   Result Value Ref Range    WBC 7.3 4.0 - 11.0 thou/uL    RBC 3.97 3.80 - 5.40 mill/uL    Hemoglobin 12.7 12.0 - 16.0 g/dL    Hematocrit 40.7 35.0 - 47.0 %     (H) 80 - 100 fL    MCH 32.0 27.0 - 34.0 pg    MCHC 31.2 (L) 32.0 - 36.0 g/dL    RDW 17.2 (H) 11.0 - 14.5 %    Platelets 332 140 - 440 thou/uL    MPV 9.0 8.5 - 12.5 fL    Neutrophils % 87 (H) 50 - 70 %    Lymphocytes % 8 (L) 20 - 40 %    Monocytes % 4 2 - 10 %    Eosinophils % 0 0 - 6 %    Basophils % 0 0 - 2 %    Neutrophils Absolute 6.3 2.0 - 7.7 thou/uL    Lymphocytes Absolute 0.6 (L) 0.8 - 4.4 thou/uL    Monocytes Absolute 0.3 0.0 - 0.9 thou/uL    Eosinophils Absolute 0.0 0.0  - 0.4 thou/uL    Basophils Absolute 0.0 0.0 - 0.2 thou/uL       Imaging    Ct Chest Abdomen Pelvis With Oral With Iv Cont    Result Date: 12/20/2017  CT CHEST, ABDOMEN, AND PELVIS 12/20/2017 12:10 PM      INDICATION: Follow-up lung cancer. TECHNIQUE: CT chest, abdomen, and pelvis. Dose reduction techniques were used. IV CONTRAST: 100 mL Omnipaque 350. COMPARISON: Chest CTA 12/11/2017, chest abdomen pelvis 11/1/2017 7/20/2017. FINDINGS: CHEST: LUNGS: Left upper lobe mass unchanged measuring approximately 3.9 cm. Interval improvement in adjacent areas of left upper lobe airspace consolidation. Widespread metastatic disease throughout both lungs slightly improved compared with 12/11/2017 with decrease in multiple metastases. Right upper lobe nodule measures 1.5 cm on image 21, previously measuring 2.5 cm. Confluent opacities cine left lower lobe are also improved. Central airways are patent. MEDIASTINUM: No adenopathy. No pleural or pericardial effusion.  ABDOMEN: Cirrhotic appearing liver with no gross focal hepatic mass cholelithiasis with no bile duct dilatation. Stable mild splenic megaly measuring 14.5 cm. No ascites. Normal-appearing pancreas and adrenals. Bilateral nonobstructive nephrolithiasis. No retroperitoneal mass or aneurysm. Caliber of the bowel is normal. PELVIS: Normal-appearing uterus, ovaries and appendix. No mass, adenopathy nor abnormal fluid collection. MUSCULOSKELETAL: Rightward scoliosis with multilevel degenerative disc disease and facet arthropathy.     CONCLUSION: 1.  Left upper lobe lung mass redemonstrated. Interval improvement in metastatic disease, confluent opacities left lower lobe and left upper lobe areas of consolidation. 2.  Cirrhotic appearing liver and splenomegaly. 3.  Cholelithiasis. 4.  Bilateral nonobstructing nephrolithiasis.    Cta Chest Pe Run    Result Date: 12/11/2017  CTA CHEST PE RUN 12/11/2017 4:50 PM INDICATION: Shortness of breath. Known metastatic lung cancer.  TECHNIQUE: Helical acquisition through the chest was performed during the arterial phase of contrast enhancement using IV contrast. 2D and 3D reconstructions were performed by the CT technologist. Dose reduction techniques were used. IV CONTRAST: Iohexol (Omni) 100 mL COMPARISON: CT 11/1/2017 FINDINGS: ANGIOGRAM CHEST: Negative for pulmonary emboli. Negative for thoracic aortic dissection and aneurysms. LUNGS AND PLEURA: Overall mild generalized progression of the metastatic disease throughout the lungs since 11/1/2017. The more well-defined mass in the left upper lobe more difficult to definitively measure due to extensive surrounding metastases. Approximate 3.9 cm in maximum diameter previously 3.6. The right lung apex nodule measures 2.5 cm, previously 2.0 cm. Very minimal remaining aerated left lung. MEDIASTINUM: Infiltrative process in the anterior mediastinum has not significantly changed likely metastatic disease. LIMITED UPPER ABDOMEN: Cirrhosis. Gallstones. Unchanged. MUSCULOSKELETAL: Negative.     CONCLUSION: 1.  Fairly diffuse metastatic disease throughout both lungs significantly greater on the left side has mildly progressed since 11/1/2017. 2.  Negative for pulmonary emboli.    Us Venous Legs Bilateral    Result Date: 12/11/2017  US VENOUS LEGS BILATERAL 12/11/2017 5:45 PM INDICATION: asymmetric edema TECHNIQUE: Routine exam with compression, augmentation, and duplex utilizing 2D gray-scale imaging, Doppler interrogation with color-flow and spectral waveform analysis. COMPARISON: None. FINDINGS: The common femoral, femoral, popliteal, and segmentally visualized calf veins were evaluated. Right leg veins are negative for deep venous thrombosis. Left leg veins are negative for deep venous thrombosis. No popliteal cysts.     CONCLUSION: 1.  Bilateral leg veins are negative for DVT.        Signed by: Aniket Patel MD

## 2021-06-14 NOTE — PROGRESS NOTES
Patient arrived ambulatory after clinic visit with Dr. Patel. Lab results reviewed/ bilirubin level noted at 1.6 - spoke with Dr. Patel and per Dr. Patel OK to proceed as planned for patient to receive chemotherapy as prescribed today/ [facility] pharmacist also notified. Blood return confirmed at port/ patient tolerated pre-medication and Alimta with no complaints/no signs of adverse reaction. Patient did eat lunch during visit today. Port de-accessed in standard fashion prior to discharge. AVS printed for patient/ patient reports will confirm next scheduled appointments on [facility]. Patient discharged to home via wheelchair to main hospital entrance with [employer] as transportation to home.

## 2021-06-14 NOTE — PROGRESS NOTES
Patient arrived ambulatory, assisted by walker, for treatment.  Port accessed under aseptic technique - excellent blood return noted when flushed with NS.  Blood drawn for labs.  Port flushed with NS.  Lab results reviewed.  IV of NS initiated for treatment.  Given premed IVP and Alimta IVP over 10 minutes.  Port flushed with NS.  Heparin instilled and needle dced.  Dressing applied.  Patient left ambulatory, assisted by walker, by self, in stable condition.  Instructed to call with questions/concerns/problems.  Patient verbalized understanding.  Given Influenza and Pneumovax injections one in each arm with bandaids applied.

## 2021-06-14 NOTE — PROGRESS NOTES
[facility] Hematology and Oncology Progress Note    Patient: Arianna Gregory  MRN: [id number]  Date of Service: 11/20/2017        Reason for Visit    Chief Complaint   Patient presents with     HE Cancer     Lung Cancer       Assessment and Plan  Lung cancer, upper lobe    Staging form: Lung, AJCC 7th Edition    - Clinical: Stage IV (T2b, NX, M1b) - Signed by Laura Trejo CNP on 3/21/2016    1. Lung cancer, stage IV with plearal mets, carcinomatosis. Diagnosed in January 2016. Tumor is negative for EGFR, ALk and ROS1. PDL1 testing not done due to insufficient cells. Was off chemo from August 2016 until recently when she had progression. She is back on Alimta. Starting at 50% reduction due to performance status and peripheral neuropathy. Her main complaint is fatigue. She will reutrn in 10 days for cycle 2. We will repeat imaging after that cycle.     2. Fatigue: likely chemo induced and will be cumulative. Encourage good hydration, healthy diet with good protein. Also encourage daily exercise and to be as active as possible.     3. Neuropathy: stable for now. On lyrica. No changes. Continue to monitor.     ECOG Performance   ECOG Performance Status: 1     Distress Assessment  Distress Assessment Score: 3    Pain  Currently in Pain: Yes  Pain Score (Initial OR Reassessment): 6  Location: bilateral legs    Problem List    1. Malignant neoplasm of upper lobe of left lung  CC OFFICE VISIT LONG    Infusion Appointment    CC OFFICE VISIT LONG    Infusion Appointment    CT Chest Abdomen Pelvis With Oral With IV Cont   2. Fatigue        ______________________________________________________________________________    History of Present Illness       Measurable disease: CT of the chest      Current therapy: Observation      Treatment history: 3 cycles of maintenance Alimta, last August 2016      Carboplatin and Alimta for 6 cycles  Patient received cycle 1 with carboplatin and Alimta  She received carboplatin and  Alimta and Avastin for 2 cycles    Interim History: pt is here today for a toxicity check. She is doing pretty well. Noticed a lot of fatigue and feels like it is much worse than when she was on chemo before. Leg pain is stable. No new issues. Mild nausea.     Pain Status  Currently in Pain: Yes    Review of Systems    Constitutional  Constitutional (WDL): Exceptions to WDL  Fatigue: Fatigue not relieved by rest - Limiting instrumental ADL  Weight Loss: to <10% from baseline, intervention not indicated (down 8lbs since 11/3)  Neurosensory  Neurosensory (WDL): Exceptions to WDL  Peripheral Motor Neuropathy: Asymptomatic, clinical or diagnostic observations only, intervention not indicated  Ataxia: Asymptomatic, clinical or diagnostic observations only, intervention not indicated (walker)  Peripheral Sensory Neuropathy: Asymptomatic, loss of deep tendon reflexes or paresthesia  Eye   Eye Disorder (WDL): Exceptions to WDL  Blurred Vision: Intervention not indicated  Dry Eye: Asymptomatic, clinical or diagnostic observations only, mild symptoms relieved by lubricants  Ear  Ear Disorder (WDL): All ear disorder elements are within defined limits  Cardiovascular  Cardiovascular (WDL): All cardiovascular elements are within defined limits  Pulmonary  Respiratory (WDL): Exceptions to WDL  Cough: Mild symptoms, nonprescription intervention indicated  Dyspnea: Shortness of breath with moderate exertion  Gastrointestinal  Gastrointestinal (WDL): Exceptions to WDL  Anorexia: Loss of appetite without alteration in eating habits (appetite is changing)  Nausea: Loss of appetite without alteration in eating habits  Dysgeusia: Altered taste but no change in diet  Genitourinary  Genitourinary (WDL): All genitourinary elements are within defined limits  Lymphatic  Lymph (WDL): All lymph disorder elements are within defined limits  Musculoskeletal and Connective Tissue  Musculoskeletal and Connetive Tissue Disorders (WDL): Exceptions to  WDL  Muscle Weakness : Symptomatic, perceived by patient but not evident on physical exam  Myalgia: Mild pain  Integumentary  Integumentary (WDL): All integumentary elements are within defined limits  Patient Coping  Patient Coping: Accepting  Distress Assessment  Distress Assessment Score: 3  Accompanied by  Accompanied by: Alone  Oral Chemo Adherence       Past History  Past Medical History:   Diagnosis Date     Anxiety      Arthritis      Bilateral leg weakness      Bone spur of other site     on spine     Cancer     lungs      Cataracts, bilateral      Cellulitis of leg     Bilateral     Diabetes mellitus      MOELLER (dyspnea on exertion)      History of transfusion      Lung cancer      Lung mass      Mediastinal adenopathy      Metastatic cancer      Myelitis, acute transverse      On home oxygen therapy     prn at home, uses it when out     Sciatica      Spine pain      Venous stasis of lower extremity        PHYSICAL EXAM:  /62  Pulse 96  Temp 97.7  F (36.5  C) (Oral)   Wt (!) 273 lb 14.4 oz (124.2 kg)  SpO2 (!) 89%  BMI 47.01 kg/m2  GENERAL: no acute distress. Cooperative in conversation. Here alone, using walker.   HEENT: pupils are equal, round and reactive. Oromucosa is clean and intact. No ulcerations or mucositis noted. No bleeding noted.  RESP: lungs are clear bilaterally per auscultation. Regular respiratory rate. No wheezes or rhonchi.  CV: Regular, rate and rhythm. No murmurs.  ABD: soft, nontender. Positive bowel sounds. No organomegaly.   MUSCULOSKELETAL: No lower extremity swelling. Compression stockings on.   NEURO: non focal. Alert and oriented x3.   PSYCH: within normal limits. No depression or anxiety.  SKIN: warm dry intact   LYMPH: no cervical, supraclavicular lymphadenopathy        Lab Results    Recent Results (from the past 168 hour(s))   Comprehensive Metabolic Panel   Result Value Ref Range    Sodium 142 136 - 145 mmol/L    Potassium 3.5 3.5 - 5.0 mmol/L    Chloride 105 98 -  107 mmol/L    CO2 28 22 - 31 mmol/L    Anion Gap, Calculation 9 5 - 18 mmol/L    Glucose 132 (H) 70 - 125 mg/dL    BUN 13 8 - 28 mg/dL    Creatinine 0.68 0.60 - 1.10 mg/dL    GFR MDRD Af Amer >60 >60 mL/min/1.73m2    GFR MDRD Non Af Amer >60 >60 mL/min/1.73m2    Bilirubin, Total 1.1 (H) 0.0 - 1.0 mg/dL    Calcium 8.8 8.5 - 10.5 mg/dL    Protein, Total 7.0 6.0 - 8.0 g/dL    Albumin 2.9 (L) 3.5 - 5.0 g/dL    Alkaline Phosphatase 113 45 - 120 U/L    AST 18 0 - 40 U/L    ALT 12 0 - 45 U/L   HM1 (CBC with Diff)   Result Value Ref Range    WBC 3.7 (L) 4.0 - 11.0 thou/uL    RBC 3.74 (L) 3.80 - 5.40 mill/uL    Hemoglobin 11.9 (L) 12.0 - 16.0 g/dL    Hematocrit 36.4 35.0 - 47.0 %    MCV 97 80 - 100 fL    MCH 31.8 27.0 - 34.0 pg    MCHC 32.7 32.0 - 36.0 g/dL    RDW 13.8 11.0 - 14.5 %    Platelets 127 (L) 140 - 440 thou/uL    MPV 9.8 8.5 - 12.5 fL       Imaging    Ct Chest Abdomen Pelvis With Oral With Iv Cont    Result Date: 11/1/2017  CT CHEST, ABDOMEN, AND PELVIS 11/1/2017 1:22 PM      INDICATION: Malignant neoplasm of upper lobe of left lung. TECHNIQUE: CT chest, abdomen, and pelvis. Dose reduction techniques were used. IV CONTRAST: Iohexol (Omni) 100 mL. COMPARISON: 7/28/2017 and 5/30/2017. FINDINGS: CHEST: Left upper lobe lung mass 3.6 x 3.3 cm (image 32 of series 2) previously 3.3 x 2.8 cm. Progressive consolidation throughout the apicoposterior segment left upper lobe and also some progression although to a lesser degree in the anterior segment left upper lobe. Innumerable bilateral discrete pulmonary nodules have progressed. For example, pulmonary nodule in the apical segment right upper lobe (image 23 of series 3) 2.0 cm previously 1.0 cm. In addition, soft tissue density thickening and nodularity following a bronchovascular distribution in the basilar segments left lower lobe have progressed, which may indicate lymphangitic spread. Pleural metastases manifest as mild confluent nodular soft tissue density thickening  throughout the left hemithorax have increased. Area of soft tissue thickening anterior mediastinum (image 43 of series 2) 3.5 x 1.3 cm previously 3.0 x 1.0 cm. Mild stable mediastinal lymph node enlargement. Left subclavian central venous catheter tip mid SVC.  ABDOMEN: Liver has an irregular contour consistent with cirrhosis. No focal liver lesions identified. Mild splenic enlargement. Cholelithiasis. Nonobstructing renal stones bilaterally. Liver, spleen, pancreas, kidneys, adrenal glands, gallbladder, bile ducts, and abdominal aorta are otherwise negative. PELVIS: Prominent iliac lymph nodes stable. Small intestine, colon, appendix, ureters, bladder, uterus and adnexa otherwise negative. MUSCULOSKELETAL: Bones appear demineralized. No bone lesions. Severe multilevel degenerative disc disease lumbar spine.     CONCLUSION: 1.  Left upper lobe mass has increased in size and there has been progression of consolidation throughout the left upper lobe, bilateral pulmonary nodules, bronchovascular/lymphangitic metastases in the left lower lobe, mediastinal disease and left pleural metastases. 2.  Liver has a cirrhotic configuration. 3.  Cholelithiasis. 4.  Nonobstructing kidney stones bilaterally.        Signed by: Laura Trejo, CNP

## 2021-06-15 NOTE — PROGRESS NOTES
Pt ambulates to infusion center for lab draw prior to NP visit.  IVAD accessed, labs drawn et sent w/results noted. Pt seen by MACEY Trejo CNP and orders approved.  Pt returns to infusion center for treatment.  Pre-med and alimta infused as ordered without complication.  Vitamin B12 injection given as ordered.  IVAD flushed w/NS et capped.  Pt escorted via WC to 1st floor infusion center for blood transfusion.

## 2021-06-15 NOTE — PROGRESS NOTES
[facility] Hematology and Oncology Progress Note    Patient: Arianna Gregory  MRN: [id number]  Date of Service:         Reason for Visit    No chief complaint on file.      Assessment and Plan  Lung cancer, upper lobe    Staging form: Lung, AJCC 7th Edition    - Clinical: Stage IV (T2b, NX, M1b) - Signed by Laura Trejo CNP on 3/21/2016    1. Lung cancer, stage IV with plearal mets, carcinomatosis. Diagnosed in January 2016. Tumor is negative for EGFR, ALk and ROS1. PDL1 testing not done due to insufficient cells. Was off chemo from August 2016 until recently when she had progression. She is back on Alimta. Starting at 50% reduction due to performance status and peripheral neuropathy. Her main complaint is fatigue. She had some improvement in disease on December scan. She will get alimta today at 50% dose. She will then return in 3 weeks with repeat CT scan.     2. Fatigue: likely chemo induced and will be cumulative. Encourage good hydration, healthy diet with good protein. Also encourage daily exercise and to be as active as possible.     3. Worsening anemia: significnat worse. She is symptomatic with MOELLER, lightheaded. We will give one unit of blood today. Consent done. I will order TSH, PAMELLA, Haptoglobin, vitamin B12 and folate.     ECOG Performance   ECOG Performance Status: 2     Distress Assessment  Distress Assessment Score: No distress    Pain  Currently in Pain: Yes  Pain Score (Initial OR Reassessment): 6  Location: legs    Problem List    1. Anemia  TSH    Folate, Serum    Vitamin B12   2. Malignant neoplasm of upper lobe of left lung  CC OFFICE VISIT LONG    Infusion Appointment    CT Chest Abdomen Pelvis With Oral With IV Cont    DISCONTINUED: cyanocobalamin injection 1,000 mcg    DISCONTINUED: sodium chloride 0.9% 250 mL infusion    DISCONTINUED: ondansetron 8 mg in sodium chloride 0.9% 25 mL (ZOFRAN)    DISCONTINUED: PEMEtrexed 1,200 mg in sodium chloride 0.9% 100 mL chemo (ALIMTA)     DISCONTINUED: sodium chloride 0.9 % flush 20 mL (NS)    DISCONTINUED: heparin 100 unit/mL lockflush (PF) porcine 300-600 Units    DISCONTINUED: diphenhydrAMINE injection 50 mg (BENADRYL)    DISCONTINUED: famotidine 20 mg/2 mL injection 20 mg (PEPCID)    DISCONTINUED: hydrocortisone sod succ (PF) 100 mg/2 mL injection 100 mg    DISCONTINUED: acetaminophen tablet 1,000 mg (TYLENOL)      ______________________________________________________________________________    History of Present Illness       Measurable disease: CT of the chest      Current therapy: Maintenance Alimta, today is cycle 4      Treatment history: 3 cycles of maintenance Alimta, last August 2016      Carboplatin and Alimta for 6 cycles  Patient received cycle 1 with carboplatin and Alimta  She received carboplatin and Alimta and Avastin for 2 cycles    Interim History: pt is here today to continue on chemo.  She has noticed much more shortness of breath with activity lately. Not much coughing. No fevers. Continues to have swelling in legs. Feels lightheaded at times. Taking folic acid every day.     Pain Status  Currently in Pain: Yes    Review of Systems    Constitutional  Constitutional (WDL): Exceptions to WDL  Fatigue: Fatigue not relieved by rest - Limiting instrumental ADL  Neurosensory  Neurosensory (WDL): Exceptions to WDL  Peripheral Motor Neuropathy: Asymptomatic, clinical or diagnostic observations only, intervention not indicated  Ataxia: Moderate symptoms, limiting instrumental ADL  Peripheral Sensory Neuropathy: Severe symptoms, limiting self care ADL  Eye   Eye Disorder (WDL): Exceptions to WDL  Blurred Vision: Intervention not indicated  Ear  Ear Disorder (WDL): All ear disorder elements are within defined limits  Cardiovascular  Cardiovascular (WDL): Exceptions to WDL  Edema: Yes  Edema Limbs: 5 - 10% inter-limb discrepancy in volume or circumference at point of greatest visible difference, swelling or obscuration of anatomic  architecture on close inspection  Pulmonary  Respiratory (WDL): Exceptions to WDL  Cough: Mild symptoms, nonprescription intervention indicated  Dyspnea: Shortness of breath with minimal exertion, limiting instrumental ADL  Hypoxia: Decreased oxygen saturation with exercise (e.g., pulse oximeter <88%), intermittent supplemental oxygen  Gastrointestinal  Gastrointestinal (WDL): Exceptions to WDL  Constipation: Occasional or intermittent symptoms, occasional use of stool softeners, laxatives, dietary modification, or enema  Dry Mouth: Symptomatic (e.g., dry or thick saliva) without significant dietary alteration, unstimulated saliva flow >0.2 ml/min  Genitourinary  Genitourinary (WDL): All genitourinary elements are within defined limits  Lymphatic  Lymph (WDL): All lymph disorder elements are within defined limits  Musculoskeletal and Connective Tissue  Musculoskeletal and Connetive Tissue Disorders (WDL): Exceptions to WDL  Muscle Weakness : Symptomatic, evident on physical exam, limiting instrumental ADL  Myalgia: Mild pain  Integumentary  Integumentary (WDL): All integumentary elements are within defined limits  Patient Coping  Patient Coping: Accepting  Distress Assessment  Distress Assessment Score: No distress  Accompanied by  Accompanied by: Alone  Oral Chemo Adherence       Past History  Past Medical History:   Diagnosis Date     Anxiety      Arthritis      Bilateral leg weakness      Bone spur of other site     on spine     Cancer     lungs      Cataracts, bilateral      Cellulitis of leg     Bilateral     Diabetes mellitus      MOELLER (dyspnea on exertion)      History of transfusion      Lung cancer      Lung mass      Mediastinal adenopathy      Metastatic cancer      Myelitis, acute transverse      On home oxygen therapy     prn at home, uses it when out     Sciatica      Spine pain      Venous stasis of lower extremity        PHYSICAL EXAM:  /67  Pulse (!) 115  Temp 97.9  F (36.6  C)  Wt (!) 270  lb 1 oz (122.5 kg)  SpO2 95%  BMI 46.36 kg/m2  GENERAL: no acute distress. Cooperative in conversation. Here alone, using walker. Quite SOB with exertion.   HEENT: pupils are equal, round and reactive. Oromucosa is clean and intact. No ulcerations or mucositis noted. No bleeding noted.  RESP: lungs are clear bilaterally per auscultation. Regular respiratory rate. No wheezes or rhonchi.  CV: Regular, rate and rhythm. No murmurs.  ABD: soft, nontender. Positive bowel sounds. No organomegaly.   MUSCULOSKELETAL: No lower extremity swelling. Compression stockings on.   NEURO: non focal. Alert and oriented x3.   PSYCH: within normal limits. No depression or anxiety.  SKIN: warm dry intact   LYMPH: no cervical, supraclavicular lymphadenopathy        Lab Results    Recent Results (from the past 168 hour(s))   Comprehensive Metabolic Panel   Result Value Ref Range    Sodium 141 136 - 145 mmol/L    Potassium 3.8 3.5 - 5.0 mmol/L    Chloride 99 98 - 107 mmol/L    CO2 24 22 - 31 mmol/L    Anion Gap, Calculation 18 5 - 18 mmol/L    Glucose 143 (H) 70 - 125 mg/dL    BUN 16 8 - 28 mg/dL    Creatinine 0.79 0.60 - 1.10 mg/dL    GFR MDRD Af Amer >60 >60 mL/min/1.73m2    GFR MDRD Non Af Amer >60 >60 mL/min/1.73m2    Bilirubin, Total 1.7 (H) 0.0 - 1.0 mg/dL    Calcium 9.3 8.5 - 10.5 mg/dL    Protein, Total 7.0 6.0 - 8.0 g/dL    Albumin 2.9 (L) 3.5 - 5.0 g/dL    Alkaline Phosphatase 132 (H) 45 - 120 U/L    AST 33 0 - 40 U/L    ALT 12 0 - 45 U/L   HM1 (CBC with Diff)   Result Value Ref Range    WBC 6.7 4.0 - 11.0 thou/uL    RBC 2.43 (L) 3.80 - 5.40 mill/uL    Hemoglobin 7.7 (L) 12.0 - 16.0 g/dL    Hematocrit 25.7 (L) 35.0 - 47.0 %     (H) 80 - 100 fL    MCH 31.7 27.0 - 34.0 pg    MCHC 30.0 (L) 32.0 - 36.0 g/dL    RDW 18.6 (H) 11.0 - 14.5 %    Platelets 337 140 - 440 thou/uL    MPV 9.4 8.5 - 12.5 fL    Neutrophils % 89 (H) 50 - 70 %    Lymphocytes % 8 (L) 20 - 40 %    Monocytes % 3 2 - 10 %    Eosinophils % 0 0 - 6 %    Basophils  % 0 0 - 2 %    Neutrophils Absolute 5.8 2.0 - 7.7 thou/uL    Lymphocytes Absolute 0.6 (L) 0.8 - 4.4 thou/uL    Monocytes Absolute 0.2 0.0 - 0.9 thou/uL    Eosinophils Absolute 0.0 0.0 - 0.4 thou/uL    Basophils Absolute 0.0 0.0 - 0.2 thou/uL       Imaging    Ct Chest Abdomen Pelvis With Oral With Iv Cont    Result Date: 12/20/2017  CT CHEST, ABDOMEN, AND PELVIS 12/20/2017 12:10 PM      INDICATION: Follow-up lung cancer. TECHNIQUE: CT chest, abdomen, and pelvis. Dose reduction techniques were used. IV CONTRAST: 100 mL Omnipaque 350. COMPARISON: Chest CTA 12/11/2017, chest abdomen pelvis 11/1/2017 7/20/2017. FINDINGS: CHEST: LUNGS: Left upper lobe mass unchanged measuring approximately 3.9 cm. Interval improvement in adjacent areas of left upper lobe airspace consolidation. Widespread metastatic disease throughout both lungs slightly improved compared with 12/11/2017 with decrease in multiple metastases. Right upper lobe nodule measures 1.5 cm on image 21, previously measuring 2.5 cm. Confluent opacities cine left lower lobe are also improved. Central airways are patent. MEDIASTINUM: No adenopathy. No pleural or pericardial effusion.  ABDOMEN: Cirrhotic appearing liver with no gross focal hepatic mass cholelithiasis with no bile duct dilatation. Stable mild splenic megaly measuring 14.5 cm. No ascites. Normal-appearing pancreas and adrenals. Bilateral nonobstructive nephrolithiasis. No retroperitoneal mass or aneurysm. Caliber of the bowel is normal. PELVIS: Normal-appearing uterus, ovaries and appendix. No mass, adenopathy nor abnormal fluid collection. MUSCULOSKELETAL: Rightward scoliosis with multilevel degenerative disc disease and facet arthropathy.     CONCLUSION: 1.  Left upper lobe lung mass redemonstrated. Interval improvement in metastatic disease, confluent opacities left lower lobe and left upper lobe areas of consolidation. 2.  Cirrhotic appearing liver and splenomegaly. 3.  Cholelithiasis. 4.  Bilateral  nonobstructing nephrolithiasis.        Signed by: Laura Trejo, CNP

## 2021-06-15 NOTE — PROGRESS NOTES
Late entry.  I met with Arianna in the infusion room last Friday.  She began tx with Opdivo and said she doesn't plan to make a decision about hospice until her f/u in 2 weeks.  She'd like to see how she handles the tx and wether she responds to it.  She is interested in gathering information and Dr. Patel ordered a hospice referral.  She talked again about the changes she has seen in herself.  She said she doesn't bounce back like she had been.  She gets SOB with nay activity, even just walking to the bathroom.  She said she pretty much keeps her O2 set on 6L.  She has little endurance to  the kitchen even to heat food in the microwave.  She began getting Meals on Wheels which she appreciates as the food arrives hot.  She's still considering [facility] [place] and said if she decides to go with it, she will likely continue Meals on Wheels as well.  She also needs to clear out her freezer as some food have been in there for quite some time.    Arianna shared that she's had no contact with her family with the exception of her brother whom she emails about once/month.  She said she made the decision of choosing alcohol over family years ago.  She is no longer drinking but has not connected with any other family members.  She plans to email her brother when she makes the decision to sign onto hospice as she wants him to manage her finances.  Arianna also plans to call her  home to make sure things are in order.  Arianna was asking if there are any companies that can sort through belongings after someone has passed.  She said she doesn't think she wants to be present when this occurs.  She's debated giving some of her items to her cleaning lady, Sandy, but hasn't made any decisions.  She also plans to ask this questions of her managers in her apartment.  I told Arianna I would look into this for her and f/u with information.  I listened and provided support.  Arianna had no additional questions or needs at the time of  our visit.

## 2021-06-15 NOTE — PROGRESS NOTES
Pt came into infusion clinic for Day 1, Cycle 1 of her treatment. Consent verified. Unable to obtain blood return from port. Labs drawn peripherally. Port flushes well with no pain so port used for tx. Instructed pt that if port did not work next time we would TPA it. Pt was agreeable to this. Labs Reviewed. Medications explained to pt who verbalized understanding. Port patent throughout infusion. Pt tolerated infusion with no complications. Pt left infusion clinic via ambulatory and will RTC as sched.

## 2021-06-15 NOTE — PROGRESS NOTES
Pt arrived via the w/c from cancer care, with her 02, Portacath needle inplace, vss Went over todays  plan  of care , went over blood transfusion, side effects, and follow up, Pt infused with 1 unit of blood with 02 at 3l. Pt was assisted to the bathroom twice, IV was flushed with ns then heparin to the port, Needle dcd after, vss, Went over post transfusion instructions , verbalizes understanding Pt was brought by w/c to the front  Door with the 02 on, then pt switched to her 02, pt waiting for met mobility at 1615  Aviva Schmidt

## 2021-06-15 NOTE — PROGRESS NOTES
[facility] Hematology and Oncology Progress Note    Patient: Arianna Gregory  MRN: [id number]  Date of Service:         Reason for Visit    Chief Complaint   Patient presents with     HE Cancer       Assessment and Plan    Stage IV adenocarcinoma the lung with pleural metastases and, carcinomatosis; diagnosis in January 2016  Tumor is negative for EGFR mutation, Alk gene rearrangement, Ros 1 gene rearrangement, PDL 1 testing could not be done because of insufficient tissue  History of transverse myelitis   Pain  Anemia  Hospitalization for pneumonia December 11, 2017    CT scan show progression of cancer.  Recommend that we stop Alimta.  Will also stop folic acid and dexamethasone.  Discussed options and I recommend consideration for Nivolumab as the next line of therapy.    Discussed how this is administered and reviewed potential side effects including but not limited to rash, diarrhea, colitis, renal hepatic and pulmonary and endocrine dysfunction.  She understands and is willing to proceed and did sign a consent.  She will get first treatment today and return in 2 weeks for next treatment.  We will consider restaging scans after 4 cycles.    Discussed that after Nivolumab we may not have good options for treatment.  May need to consider comfort care and hospice.  She is okay to schedule a hospice informational visit at this time.  She will probably need inpatient hospice care as she does not have a support system.    Discussed CODE STATUS with her and she has expressed a desire for no resuscitation.    Plan: Stop Alimta, dexamethasone and folic acid  Start Nivolumab every 2 weeks beginning today  Hospice informational visit  DNR  Follow-up in 2 weeks, plan restaging after 4 cycles      Measurable disease: CT of the chest     Current therapy: Nivolumab day 1 cycle 1     Treatment history:  Alimta for 4 cycles between November 2017 and January 2018   3 cycles of maintenance Alimta, last August 2016  Carboplatin and  Alimta for 6 cycles  Patient received cycle 1 with carboplatin and Alimta  She received carboplatin and Alimta and Avastin for 2 cycles     Lung cancer, upper lobe    Staging form: Lung, AJCC 7th Edition      Clinical: Stage IV (T2b, NX, M1b) - Signed by Laura Trejo CNP on 3/21/2016    ECOG Performance   ECOG Performance Status: 2    Distress Assessment  Distress Assessment Score: 1    Pain  Pain Score (Initial OR Reassessment): 5        Problem List    1. Malignant neoplasm of upper lobe of left lung  Infusion Appointment    CC OFFICE VISIT LONG    Infusion Appointment    CC OFFICE VISIT LONG    Ambulatory referral to Hospice    DNR (Do Not Resuscitate, Includes DNI)    DNR (Do Not Resuscitate, Includes DNI)   2. Malignant neoplasm of upper lobe of right lung          CC: Lupe Mclean MD    ______________________________________________________________________________    History of Present Illness    Ms. Arianna Gregory is here for reevaluation.  She is completed his fourth cycle of Alimta.  Has been having more trouble with fatigue and shortness of breath.  Continues on oxygen.  No fever or mild source.  No new abdominal or bone pain.  ECOG status is 2-3.    Pain Status  Currently in Pain: Yes    Review of Systems    Constitutional  Constitutional (WDL): Exceptions to WDL  Fatigue: Fatigue not relieved by rest - Limiting instrumental ADL  Neurosensory  Neurosensory (WDL): Exceptions to WDL  Peripheral Motor Neuropathy: Asymptomatic, clinical or diagnostic observations only, intervention not indicated  Ataxia: Moderate symptoms, limiting instrumental ADL  Peripheral Sensory Neuropathy: Severe symptoms, limiting self care ADL  Cardiovascular  Cardiovascular (WDL): Exceptions to WDL  Edema: Yes  Edema Limbs: 5 - 10% inter-limb discrepancy in volume or circumference at point of greatest visible difference, swelling or obscuration of anatomic architecture on close inspection  Pulmonary  Respiratory  (WDL): Exceptions to WDL  Cough: Mild symptoms, nonprescription intervention indicated  Dyspnea: Shortness of breath with minimal exertion, limiting instrumental ADL  Hypoxia: Decreased oxygen saturation with exercise (e.g., pulse oximeter <88%), intermittent supplemental oxygen  Gastrointestinal  Gastrointestinal (WDL): Exceptions to WDL  Constipation: Occasional or intermittent symptoms, occasional use of stool softeners, laxatives, dietary modification, or enema  Dry Mouth: Symptomatic (e.g., dry or thick saliva) without significant dietary alteration, unstimulated saliva flow >0.2 ml/min  Genitourinary  Genitourinary (WDL): All genitourinary elements are within defined limits  Integumentary  Integumentary (WDL): All integumentary elements are within defined limits  Patient Coping  Patient Coping: Accepting  Distress Assessment  Distress Assessment Score: 1  Accompanied by  Accompanied by: Alone    Past History  Past Medical History:   Diagnosis Date     Anxiety      Arthritis      Bilateral leg weakness      Bone spur of other site     on spine     Cancer     lungs      Cataracts, bilateral      Cellulitis of leg     Bilateral     Diabetes mellitus      MOELLER (dyspnea on exertion)      History of transfusion      Lung cancer      Lung mass      Mediastinal adenopathy      Metastatic cancer      Myelitis, acute transverse      On home oxygen therapy     prn at home, uses it when out     Sciatica      Spine pain      Venous stasis of lower extremity          Past Surgical History:   Procedure Laterality Date     BREAST BIOPSY Right      cataract surgery Bilateral 2015     EYE SURGERY      lasix procedure     FRACTURE SURGERY       Lung bx      EBUS     CO INSJ TUNNELED CTR VAD W/SUBQ PORT AGE 6 YR/> Left 2/22/2016    Procedure: PORT PLACEMENT;  Surgeon: Huber Bai MD;  Location: [facility];  Service: General     THORACOSCOPY Left 1/14/2016    Procedure: LEFT THORACOSCOPY ;  Surgeon: Huber Bai,  MD;  Location: [facility];  Service:      TONSILLECTOMY       TUBAL LIGATION         Physical Exam    Recent Vitals 1/11/2018   Height -   Weight -   BSA (m2) -   /53   Pulse 105   Temp 98.2   Temp src -   SpO2 93   Some recent data might be hidden       GENERAL: Alert and oriented. Seated comfortably. In no distress.    HEAD: Atraumatic and normocephalic.  Has a full head of hair.    EYES: KARINA, EOMI.  No pallor.  No icterus.    Oral cavity: no mucosal lesion or tonsillar enlargement.    NECK: supple. JVP normal.  No thyroid enlargement.    LYMPH NODES: No palpable, cervical, axillary or inguinal lymphadenopathy.    CHEST: clear to auscultation bilaterally but decreased air entry  Resonant to percussion throughout bilaterally.  Symmetrical breath movements bilaterally.    CVS: S1 and S2 are heard. Regular rate and rhythm.  No murmur or gallop or rub heard.    ABDOMEN: Soft. Not tender. Not distended.  No palpable hepatomegaly or splenomegaly.  No other mass palpable.  Bowel sounds heard.    EXTREMITIES: Warm.  No peripheral edema.    SKIN: no rash, or bruising or purpura.    CNS: Awake alert oriented ×3.  She has some lower extremity weakness which is stable.        Lab Results    Recent Results (from the past 168 hour(s))   Comprehensive Metabolic Panel   Result Value Ref Range    Sodium 144 136 - 145 mmol/L    Potassium 3.8 3.5 - 5.0 mmol/L    Chloride 101 98 - 107 mmol/L    CO2 32 (H) 22 - 31 mmol/L    Anion Gap, Calculation 11 5 - 18 mmol/L    Glucose 136 (H) 70 - 125 mg/dL    BUN 9 8 - 28 mg/dL    Creatinine 0.66 0.60 - 1.10 mg/dL    GFR MDRD Af Amer >60 >60 mL/min/1.73m2    GFR MDRD Non Af Amer >60 >60 mL/min/1.73m2    Bilirubin, Total 1.1 (H) 0.0 - 1.0 mg/dL    Calcium 9.4 8.5 - 10.5 mg/dL    Protein, Total 7.0 6.0 - 8.0 g/dL    Albumin 3.1 (L) 3.5 - 5.0 g/dL    Alkaline Phosphatase 129 (H) 45 - 120 U/L    AST 27 0 - 40 U/L    ALT 11 0 - 45 U/L   HM1 (CBC with Diff)   Result Value Ref Range     WBC 4.3 4.0 - 11.0 thou/uL    RBC 2.64 (L) 3.80 - 5.40 mill/uL    Hemoglobin 8.5 (L) 12.0 - 16.0 g/dL    Hematocrit 29.8 (L) 35.0 - 47.0 %     (H) 80 - 100 fL    MCH 32.2 27.0 - 34.0 pg    MCHC 28.5 (L) 32.0 - 36.0 g/dL    RDW 20.7 (H) 11.0 - 14.5 %    Platelets 236 140 - 440 thou/uL    MPV 9.5 8.5 - 12.5 fL       Imaging    Ct Chest Abdomen Pelvis With Oral With Iv Cont    Result Date: 1/30/2018  CT CHEST, ABDOMEN, AND PELVIS 1/30/2018 1:34 PM      INDICATION: lung cancer TECHNIQUE: CT chest, abdomen, and pelvis. Dose reduction techniques were used. IV CONTRAST: Iohexol (Omni) 100 mL COMPARISON: 12/20/2017 FINDINGS: CHEST: A left upper lobe mass is obscured by surrounding opacities. Interstitial and airspace opacities throughout the remainder of the left lung have increased from the prior study. Multiple small nodular opacities in the right lung have slightly increased in size and number. The heart is mildly enlarged. Normal esophagus. A left PICC tip terminates in the upper SVC. No thoracic lymphadenopathy.  ABDOMEN: Mild splenomegaly. Normal pancreas and adrenal glands. There is a nodular liver contour. Cholelithiasis is noted. Multiple calcifications in the kidneys represent nonobstructing stones. No ureteral calculi. No hydronephrosis or hydroureter. Hypoattenuating left renal lesion is too small to characterize though has not changed. There is atherosclerotic calcification. Normal stomach. Normal caliber of the small bowel. PELVIS: Nondistended urinary bladder. Normal uterus. No adnexal masses. Normal colon and appendix. MUSCULOSKELETAL: Advanced degenerative changes are present. There is lateral curvature of the spine.     CONCLUSION: 1.  Interstitial and airspace opacities in the left lung have increased from the prior study. An underlying left upper lobe mass is obscured. Pulmonary nodules in the right lung have increased in size and number. Findings likely represent progression of malignancy. 2.   Otherwise no change. Findings are detailed above.        Signed by: Aniket Patel MD

## 2021-06-16 PROBLEM — R04.2 HEMOPTYSIS: Status: ACTIVE | Noted: 2017-12-11

## 2021-06-16 PROBLEM — L03.90 CELLULITIS: Status: ACTIVE | Noted: 2017-12-11

## 2021-06-16 NOTE — PROGRESS NOTES
Pt here for opdivo,  Port accessed easily and treatment administered as directed.  Pt here using walker but was brought to main entrance in wheel chair for ease.  Port flushed and deaccessed upon completion.  Pt aware of future appointments

## 2021-06-16 NOTE — PROGRESS NOTES
[facility] Hematology and Oncology Progress Note    Patient: Arianna Greogry  MRN: [id number]  Date of Service:         Reason for Visit    Chief Complaint   Patient presents with     HE Cancer       Assessment and Plan  Lung cancer, upper lobe    Staging form: Lung, AJCC 7th Edition    - Clinical: Stage IV (T2b, NX, M1b) - Signed by Laura Trejo CNP on 3/21/2016    1. Lung cancer, stage IV with plearal mets, carcinomatosis. Diagnosed in January 2016. Tumor is negative for EGFR, ALk and ROS1. PDL1 testing not done due to insufficient cells. Was off chemo from August 2016 until recently when she had progression. She had 4 cycles of alimta and had progression on 1/30 so has started on Opdivo. Tolerated the first dose well. Continue full dose for now. She will get cycle 1, day 15 today and return for cycle 2. continue to monitor labs monthly.     2. Fatigue: likely chemo induced and will be cumulative. Encourage good hydration, healthy diet with good protein. Also encourage daily exercise and to be as active as possible.     3. SOB, MOELLER: slightly improved. Able to do a little more around the house. Using oxygen most of the time.     ECOG Performance   ECOG Performance Status: 2     Distress Assessment  Distress Assessment Score: 6: declined any further assistance. Did meet with hospice for information and she states that went pretty well for the most part.     Pain  Currently in Pain: Yes  Pain Score (Initial OR Reassessment): 5  Location: legs: chronic issues.     Problem List    1. Leg weakness, bilateral     2. Malignant neoplasm of upper lobe of left lung  Infusion Appointment    [facility] OFFICE VISIT LONG   3. MOELLER (dyspnea on exertion)        ______________________________________________________________________________    History of Present Illness       Measurable disease: CT of the chest      Current therapy: Recently started on Opdivo. Today is cycle 1, day 15.       Treatment history:   Recently we  restarted maintenance Alimta with progression in November. She had 4 cycles and then had progression  3 cycles of maintenance Alimta, last August 2016.       Carboplatin and Alimta for 6 cycles  Patient received cycle 1 with carboplatin and Alimta  She received carboplatin and Alimta and Avastin for 2 cycles    Interim History: pt is here today to continue on treatment. She was started on opdivo 2 weeks ago. She states her breathing, sob and exertional dyspnea is all a little better. She is using oxygen almost all of the time. Doesn't always do it at night.     Pain Status  Currently in Pain: Yes    Review of Systems    Constitutional  Constitutional (WDL): Exceptions to WDL  Fatigue: Fatigue relieved by rest  Neurosensory  Neurosensory (WDL): Exceptions to WDL  Peripheral Motor Neuropathy: Asymptomatic, clinical or diagnostic observations only, intervention not indicated  Ataxia: Moderate symptoms, limiting instrumental ADL  Peripheral Sensory Neuropathy: Severe symptoms, limiting self care ADL  Eye   Eye Disorder (WDL): Exceptions to WDL  Blurred Vision: Intervention not indicated  Ear  Ear Disorder (WDL): All ear disorder elements are within defined limits  Cardiovascular  Cardiovascular (WDL): Exceptions to WDL  Edema: Yes  Pulmonary  Respiratory (WDL): Exceptions to WDL  Cough: Mild symptoms, nonprescription intervention indicated  Dyspnea: Shortness of breath with minimal exertion, limiting instrumental ADL  Hypoxia: Decreased oxygen saturation with exercise (e.g., pulse oximeter <88%), intermittent supplemental oxygen  Gastrointestinal  Gastrointestinal (WDL): Exceptions to WDL  Constipation: Occasional or intermittent symptoms, occasional use of stool softeners, laxatives, dietary modification, or enema  Genitourinary  Genitourinary (WDL): All genitourinary elements are within defined limits  Lymphatic  Lymph (WDL): All lymph disorder elements are within defined limits  Musculoskeletal and Connective  "Tissue  Musculoskeletal and Connetive Tissue Disorders (WDL): Exceptions to WDL  Muscle Weakness : Symptomatic, evident on physical exam, limiting instrumental ADL  Myalgia: Mild pain  Integumentary  Integumentary (WDL): Exceptions to WDL (states \"open sores on butt\")  Patient Coping  Patient Coping: Accepting  Distress Assessment  Distress Assessment Score: 6  Accompanied by  Accompanied by: Alone  Oral Chemo Adherence       Past History  Past Medical History:   Diagnosis Date     Anxiety      Arthritis      Bilateral leg weakness      Bone spur of other site     on spine     Cancer     lungs      Cataracts, bilateral      Cellulitis of leg     Bilateral     Diabetes mellitus      MOELLER (dyspnea on exertion)      History of transfusion      Lung cancer      Lung mass      Mediastinal adenopathy      Metastatic cancer      Myelitis, acute transverse      On home oxygen therapy     prn at home, uses it when out     Sciatica      Spine pain      Venous stasis of lower extremity        PHYSICAL EXAM:  /69  Pulse (!) 102  Temp 97.9  F (36.6  C) (Oral)   Wt (!) 267 lb (121.1 kg)  SpO2 96%  BMI 45.83 kg/m2  GENERAL: no acute distress. Cooperative in conversation. Here alone, using walker. SOB with exertion but pt thinks this is a little better.   HEENT: pupils are equal, round and reactive. Oromucosa is clean and intact. No ulcerations or mucositis noted. No bleeding noted.  RESP: lungs are clear bilaterally per auscultation. Regular respiratory rate. No wheezes or rhonchi.  CV: Regular, rate and rhythm. No murmurs.  ABD: soft, nontender. Positive bowel sounds. No organomegaly.   MUSCULOSKELETAL: No lower extremity swelling. Compression stockings on.   NEURO: non focal. Alert and oriented x3.   PSYCH: within normal limits. No depression or anxiety.  SKIN: warm dry intact   LYMPH: no cervical, supraclavicular lymphadenopathy        Lab Results    No results found for this or any previous visit (from the past 168 " hour(s)).    Imaging    Ct Chest Abdomen Pelvis With Oral With Iv Cont    Result Date: 1/30/2018  CT CHEST, ABDOMEN, AND PELVIS 1/30/2018 1:34 PM      INDICATION: lung cancer TECHNIQUE: CT chest, abdomen, and pelvis. Dose reduction techniques were used. IV CONTRAST: Iohexol (Omni) 100 mL COMPARISON: 12/20/2017 FINDINGS: CHEST: A left upper lobe mass is obscured by surrounding opacities. Interstitial and airspace opacities throughout the remainder of the left lung have increased from the prior study. Multiple small nodular opacities in the right lung have slightly increased in size and number. The heart is mildly enlarged. Normal esophagus. A left PICC tip terminates in the upper SVC. No thoracic lymphadenopathy.  ABDOMEN: Mild splenomegaly. Normal pancreas and adrenal glands. There is a nodular liver contour. Cholelithiasis is noted. Multiple calcifications in the kidneys represent nonobstructing stones. No ureteral calculi. No hydronephrosis or hydroureter. Hypoattenuating left renal lesion is too small to characterize though has not changed. There is atherosclerotic calcification. Normal stomach. Normal caliber of the small bowel. PELVIS: Nondistended urinary bladder. Normal uterus. No adnexal masses. Normal colon and appendix. MUSCULOSKELETAL: Advanced degenerative changes are present. There is lateral curvature of the spine.     CONCLUSION: 1.  Interstitial and airspace opacities in the left lung have increased from the prior study. An underlying left upper lobe mass is obscured. Pulmonary nodules in the right lung have increased in size and number. Findings likely represent progression of malignancy. 2.  Otherwise no change. Findings are detailed above.        Signed by: Laura Trejo, CNP

## 2021-06-16 NOTE — PROGRESS NOTES
Arianna came to chemo infusion this afternoon after lab and NP visit for C1 D15 Nivolumab.  Pt states she is feeling well.  No labs needed today and this was confirmed with the NP.  Port was accessed but no blood return obtained.  She may need TPA next visit.  She does not have time for this today.   Nivolumab infused over 1 hour and was well tolerated while in clinic today.  Port was flushed, heparinized then deaccessed and site covered.  Arianna d/c from clinic via wheelchair to [facility] where [facility] mobility will pick her up assisted by this RN.  She is aware of her future appointment

## 2021-06-16 NOTE — PROGRESS NOTES
[facility] Hematology and Oncology Progress Note    Patient: Arianna Gregory  MRN: [id number]  Date of Service:         Reason for Visit    Chief Complaint   Patient presents with     HE Cancer     Malignant neoplasm of upper lobe of left lung       Assessment and Plan    Stage IV adenocarcinoma the lung with pleural metastases and, carcinomatosis; diagnosis in January 2016  Tumor is negative for EGFR mutation, Alk gene rearrangement, Ros 1 gene rearrangement, PDL 1 testing could not be done because of insufficient tissue  History of transverse myelitis   Pain  Anemia  Hospitalization for pneumonia December 11, 2017    Good tolerance for Nivolumab.  Some clinical evidence of improvement with less fatigue and improvement in her breathing.  Will continue with dose #3 today and a fourth dose in about 2 weeks.  We will see patient back in 4 weeks with repeat imaging scans.  I explained that she has progression then we may need to consider palliative care with hospice.  If she is stable or has response then could continue on the same therapy.  Explained that some patients on treatment could have control of disease for several months.    Plan: Proceed with cycle 3 of Nivolumab today  Follow-up in 2 weeks for cycle 4 and in 4 weeks for next visit with repeat scans for staging    Measurable disease: CT of the chest     Current therapy: Nivolumab day 1 cycle 1     Treatment history:  Alimta for 4 cycles between November 2017 and January 2018   3 cycles of maintenance Alimta, last August 2016  Carboplatin and Alimta for 6 cycles  Patient received cycle 1 with carboplatin and Alimta  She received carboplatin and Alimta and Avastin for 2 cycles     Lung cancer, upper lobe    Staging form: Lung, AJCC 7th Edition      Clinical: Stage IV (T2b, NX, M1b) - Signed by Laura Trejo CNP on 3/21/2016    ECOG Performance   ECOG Performance Status: 2    Distress Assessment  Distress Assessment Score: 5    Pain  Pain Score (Initial  OR Reassessment): 5        Problem List    1. Malignant neoplasm of upper lobe of left lung  CT Chest Abdomen Pelvis With Oral With IV Cont    Infusion Appointment    Infusion Appointment    [facility] OFFICE VISIT LONG        CC: Lupe Mclean MD    ______________________________________________________________________________    History of Present Illness    Ms. Arianna Gregory is here for reevaluation.  4 weeks ago she was noted with progression of disease and switched to Nivolumab.  She has completed 2 cycles.  Had issues with her oxygen tank and missed appointment last Friday.  Tolerating treatments well.  No rash noted.  No diarrhea.  No abdominal pain.  Kidney and liver function are unchanged.  Slight improvement in her breathing.  ECOG performance status is 2.  No other new issues.    Pain Status  Currently in Pain: Yes    Review of Systems    Constitutional  Constitutional (WDL): Exceptions to WDL  Fatigue: Fatigue relieved by rest (Reports a bit of energy lately. )  Neurosensory  Neurosensory (WDL): Exceptions to WDL  Peripheral Motor Neuropathy: Asymptomatic, clinical or diagnostic observations only, intervention not indicated  Ataxia: Moderate symptoms, limiting instrumental ADL  Peripheral Sensory Neuropathy: Severe symptoms, limiting self care ADL  Cardiovascular  Cardiovascular (WDL): Exceptions to WDL  Edema: Yes (Lower extremities)  Pulmonary  Respiratory (WDL): Exceptions to WDL  Cough: Mild symptoms, nonprescription intervention indicated  Dyspnea: Shortness of breath with minimal exertion, limiting instrumental ADL  Hypoxia: Decreased oxygen saturation with exercise (e.g., pulse oximeter <88%), intermittent supplemental oxygen  Gastrointestinal  Gastrointestinal (WDL): Exceptions to WDL  Constipation: Occasional or intermittent symptoms, occasional use of stool softeners, laxatives, dietary modification, or enema  Genitourinary  Genitourinary (WDL): All genitourinary elements are within defined  limits  Integumentary  Integumentary (WDL): Exceptions to WDL (Reports bed sores to bottom. She indicates that they are better. )  Patient Coping  Patient Coping: Accepting  Distress Assessment  Distress Assessment Score: 5  Accompanied by  Accompanied by: Alone    Past History  Past Medical History:   Diagnosis Date     Anxiety      Arthritis      Bilateral leg weakness      Bone spur of other site     on spine     Cancer     lungs      Cataracts, bilateral      Cellulitis of leg     Bilateral     Diabetes mellitus      MOELLER (dyspnea on exertion)      History of transfusion      Lung cancer      Lung mass      Mediastinal adenopathy      Metastatic cancer      Myelitis, acute transverse      On home oxygen therapy     prn at home, uses it when out     Sciatica      Spine pain      Venous stasis of lower extremity          Past Surgical History:   Procedure Laterality Date     BREAST BIOPSY Right      cataract surgery Bilateral 2015     EYE SURGERY      lasix procedure     FRACTURE SURGERY       Lung bx      EBUS     CT INSJ TUNNELED CTR VAD W/SUBQ PORT AGE 6 YR/> Left 2/22/2016    Procedure: PORT PLACEMENT;  Surgeon: Huber Bai MD;  Location: [facility];  Service: General     THORACOSCOPY Left 1/14/2016    Procedure: LEFT THORACOSCOPY ;  Surgeon: Huber Bai MD;  Location: [facility];  Service:      TONSILLECTOMY       TUBAL LIGATION         Physical Exam    Recent Vitals 3/5/2018   Weight 268 lbs 8 oz   /69   Pulse 110   Temp 98.6   Temp src 1   SpO2 -   Some recent data might be hidden       GENERAL: Alert and oriented. Seated comfortably. In no distress.    HEAD: Atraumatic and normocephalic.  Has a full head of hair.    EYES: KARINA, EOMI.  No pallor.  No icterus.    Oral cavity: no mucosal lesion or tonsillar enlargement.    NECK: supple. JVP normal.  No thyroid enlargement.    LYMPH NODES: No palpable, cervical, axillary or inguinal lymphadenopathy.    CHEST: clear to  auscultation bilaterally but decreased air entry  Resonant to percussion throughout bilaterally.  Symmetrical breath movements bilaterally.    CVS: S1 and S2 are heard. Regular rate and rhythm.  No murmur or gallop or rub heard.    ABDOMEN: Soft. Not tender. Not distended.  No palpable hepatomegaly or splenomegaly.  No other mass palpable.  Bowel sounds heard.    EXTREMITIES: Warm.  No peripheral edema.    SKIN: no rash, or bruising or purpura.    CNS: Awake alert oriented ×3.  She has some lower extremity weakness which is stable.        Lab Results    Recent Results (from the past 168 hour(s))   Comprehensive Metabolic Panel   Result Value Ref Range    Sodium 144 136 - 145 mmol/L    Potassium 3.8 3.5 - 5.0 mmol/L    Chloride 103 98 - 107 mmol/L    CO2 31 22 - 31 mmol/L    Anion Gap, Calculation 10 5 - 18 mmol/L    Glucose 103 70 - 125 mg/dL    BUN 8 8 - 28 mg/dL    Creatinine 0.65 0.60 - 1.10 mg/dL    GFR MDRD Af Amer >60 >60 mL/min/1.73m2    GFR MDRD Non Af Amer >60 >60 mL/min/1.73m2    Bilirubin, Total 1.0 0.0 - 1.0 mg/dL    Calcium 9.4 8.5 - 10.5 mg/dL    Protein, Total 7.6 6.0 - 8.0 g/dL    Albumin 3.2 (L) 3.5 - 5.0 g/dL    Alkaline Phosphatase 139 (H) 45 - 120 U/L    AST 28 0 - 40 U/L    ALT <9 0 - 45 U/L   TSH   Result Value Ref Range    TSH 0.91 0.30 - 5.00 uIU/mL   HM1 (CBC with Diff)   Result Value Ref Range    WBC 5.9 4.0 - 11.0 thou/uL    RBC 3.76 (L) 3.80 - 5.40 mill/uL    Hemoglobin 11.7 (L) 12.0 - 16.0 g/dL    Hematocrit 39.2 35.0 - 47.0 %     (H) 80 - 100 fL    MCH 31.1 27.0 - 34.0 pg    MCHC 29.8 (L) 32.0 - 36.0 g/dL    RDW 13.7 11.0 - 14.5 %    Platelets 196 140 - 440 thou/uL    MPV 9.7 8.5 - 12.5 fL    Neutrophils % 73 (H) 50 - 70 %    Lymphocytes % 10 (L) 20 - 40 %    Monocytes % 7 2 - 10 %    Eosinophils % 9 (H) 0 - 6 %    Basophils % 1 0 - 2 %    Neutrophils Absolute 4.3 2.0 - 7.7 thou/uL    Lymphocytes Absolute 0.6 (L) 0.8 - 4.4 thou/uL    Monocytes Absolute 0.4 0.0 - 0.9 thou/uL     Eosinophils Absolute 0.5 (H) 0.0 - 0.4 thou/uL    Basophils Absolute 0.0 0.0 - 0.2 thou/uL       Imaging    No results found.      Signed by: Aniket Patel MD

## 2021-06-16 NOTE — PROGRESS NOTES
Arianna arrived A&Ox4 ambulatory and stable; pt had port accessed and TPA instilled, blood return was obtained. Line easily flushed.  Opdivo infused as ordered, pt tolerated w/o sxs adverse Rxn. Line flushed NS 20mL and instilled w heparin 6mL 1:100 and gripper needle dc'd, site covered w clean dressing. Dc eduction reviewed, pt stated understanding and that her needs were met today.

## 2021-06-17 NOTE — PROGRESS NOTES
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  Recommend we discontinue Nivolumab.  Discussed that further treatment is not likely to be of benefit and therefore would recommend palliative and comfort care with hospice.  She is agreeable to the plan.    We will discontinue Nivolumab.  Will place consult for hospice enrollment as soon as possible.  Discussed prognosis and life expectancy.  She has expressed a desire for no resuscitation.  We will see her back in 4 weeks for a follow-up visit if she is able.    Plan: As above    Measurable disease: CT of the chest     Current therapy: Nivolumab day 1 cycle 1     Treatment history:  Alimta for 4 cycles between November 2017 and January 2018   3 cycles of maintenance Alimta, last August 2016  Carboplatin and Alimta for 6 cycles  Patient received cycle 1 with carboplatin and Alimta  She received carboplatin and Alimta and Avastin for 2 cycles     Lung cancer, upper lobe    Staging form: Lung, AJCC 7th Edition      Clinical: Stage IV (T2b, NX, M1b) - Signed by Laura Trejo CNP on 3/21/2016    ECOG Performance   ECOG Performance Status: 3    Distress Assessment  Distress Assessment Score: 5    Pain  Pain Score (Initial OR Reassessment): 5        Problem List    1. Malignant neoplasm of upper lobe of left lung  CC OFFICE VISIT LONG    Ambulatory referral to Hospice         CC: Lupe Mclean MD    ______________________________________________________________________________    History of Present Illness    Ms. Arianna Gregory is here for reevaluation.  She was seen 4 weeks ago.  She has received 2 more doses of Nivolumab.  Continues to have problems with oxygen levels.  Weight loss is noted.  Continues with increased fatigue.  ECOG performance status is 2-3.    Pain Status  Currently in Pain: Yes    Review of Systems    Constitutional  Constitutional (WDL): Exceptions to WDL  Fatigue: Fatigue not relieved by rest - Limiting instrumental ADL  Weight Loss: to <10% from baseline, intervention not indicated (down 7lbs since 3/5/18)  Neurosensory  Neurosensory (WDL): Exceptions to WDL  Peripheral Motor Neuropathy: Asymptomatic, clinical or diagnostic observations only, intervention not indicated  Ataxia: Moderate symptoms, limiting instrumental ADL (walker)  Peripheral Sensory Neuropathy: Moderate symptoms, limiting instrumental ADL (left hand - same as one month ago)  Cardiovascular  Cardiovascular (WDL): All cardiovascular elements are within defined limits  Pulmonary  Respiratory (WDL): Exceptions to WDL  Cough: Mild symptoms, nonprescription intervention indicated  Dyspnea: Shortness of breath with minimal exertion, limiting instrumental ADL  Hypoxia: Decreased oxygen saturation with exercise (e.g., pulse oximeter <88%), intermittent supplemental oxygen  Gastrointestinal  Gastrointestinal (WDL): Exceptions to WDL  Anorexia: Loss of appetite without alteration in eating habits  Constipation: Occasional or intermittent symptoms, occasional use of stool softeners, laxatives, dietary modification, or enema (good with medication)  Nausea: Loss of appetite without alteration in eating habits  Genitourinary  Genitourinary (WDL): All genitourinary elements are within defined limits  Integumentary  Integumentary (WDL): All integumentary elements are within defined limits  Patient  Coping  Patient Coping: Accepting  Distress Assessment  Distress Assessment Score: 5  Accompanied by  Accompanied by: Alone    Past History  Past Medical History:   Diagnosis Date     Anxiety      Arthritis      Bilateral leg weakness      Bone spur of other site     on spine     Cancer     lungs      Cataracts, bilateral      Cellulitis of leg     Bilateral     Diabetes mellitus      MOELLER (dyspnea on exertion)      History of transfusion      Lung cancer      Lung mass      Mediastinal adenopathy      Metastatic cancer      Myelitis, acute transverse      On home oxygen therapy     prn at home, uses it when out     Sciatica      Spine pain      Venous stasis of lower extremity          Past Surgical History:   Procedure Laterality Date     BREAST BIOPSY Right      cataract surgery Bilateral 2015     EYE SURGERY      lasix procedure     FRACTURE SURGERY       Lung bx      EBUS     IN INSJ TUNNELED CTR VAD W/SUBQ PORT AGE 6 YR/> Left 2/22/2016    Procedure: PORT PLACEMENT;  Surgeon: Huber Bai MD;  Location: [facility];  Service: General     THORACOSCOPY Left 1/14/2016    Procedure: LEFT THORACOSCOPY ;  Surgeon: Huber Bai MD;  Location: [facility];  Service:      TONSILLECTOMY       TUBAL LIGATION         Physical Exam    Recent Vitals 3/30/2018   Weight 261 lbs 11 oz   /62   Pulse 98   Temp 99.1   Temp src 1   SpO2 86   Some recent data might be hidden       GENERAL: Alert and oriented. Seated comfortably. In no distress.    HEAD: Atraumatic and normocephalic.  Has a full head of hair.    EYES: KARINA, EOMI.  No pallor.  No icterus.    Oral cavity: no mucosal lesion or tonsillar enlargement.    NECK: supple. JVP normal.  No thyroid enlargement.    LYMPH NODES: No palpable, cervical, axillary or inguinal lymphadenopathy.    CHEST: clear to auscultation bilaterally but decreased air entry  Resonant to percussion throughout bilaterally.  Symmetrical breath movements  bilaterally.    CVS: S1 and S2 are heard. Regular rate and rhythm.  No murmur or gallop or rub heard.    ABDOMEN: Soft. Not tender. Not distended.  No palpable hepatomegaly or splenomegaly.  No other mass palpable.  Bowel sounds heard.    EXTREMITIES: Warm.  No peripheral edema.    SKIN: no rash, or bruising or purpura.    CNS: Awake alert oriented ×3.  She has some lower extremity weakness which is stable.        Lab Results    Recent Results (from the past 168 hour(s))   Comprehensive Metabolic Panel   Result Value Ref Range    Sodium 146 (H) 136 - 145 mmol/L    Potassium 3.6 3.5 - 5.0 mmol/L    Chloride 101 98 - 107 mmol/L    CO2 36 (H) 22 - 31 mmol/L    Anion Gap, Calculation 9 5 - 18 mmol/L    Glucose 112 70 - 125 mg/dL    BUN 9 8 - 28 mg/dL    Creatinine 0.60 0.60 - 1.10 mg/dL    GFR MDRD Af Amer >60 >60 mL/min/1.73m2    GFR MDRD Non Af Amer >60 >60 mL/min/1.73m2    Bilirubin, Total 0.9 0.0 - 1.0 mg/dL    Calcium 8.9 8.5 - 10.5 mg/dL    Protein, Total 7.1 6.0 - 8.0 g/dL    Albumin 2.9 (L) 3.5 - 5.0 g/dL    Alkaline Phosphatase 151 (H) 45 - 120 U/L    AST 21 0 - 40 U/L    ALT 9 0 - 45 U/L   TSH   Result Value Ref Range    TSH 0.92 0.30 - 5.00 uIU/mL   HM1 (CBC with Diff)   Result Value Ref Range    WBC 6.4 4.0 - 11.0 thou/uL    RBC 3.50 (L) 3.80 - 5.40 mill/uL    Hemoglobin 10.8 (L) 12.0 - 16.0 g/dL    Hematocrit 35.5 35.0 - 47.0 %     (H) 80 - 100 fL    MCH 30.9 27.0 - 34.0 pg    MCHC 30.4 (L) 32.0 - 36.0 g/dL    RDW 13.9 11.0 - 14.5 %    Platelets 151 140 - 440 thou/uL    MPV 9.1 8.5 - 12.5 fL   Manual Differential   Result Value Ref Range    Total Neutrophils % 64 50 - 70 %    Lymphocytes % 13 (L) 20 - 40 %    Monocytes % 5 2 - 10 %    Eosinophils %  17 (H) 0 - 6 %    Basophils % 1 0 - 2 %    Total Neutrophils Absolute 4.1 2.0 - 7.7 thou/ul    Lymphocytes Absolute 0.8 0.8 - 4.4 thou/uL    Monocytes Absolute 0.3 0.0 - 0.9 thou/uL    Eosinophils Absolute 1.1 (H) 0.0 - 0.4 thou/uL    Basophils Absolute  0.1 0.0 - 0.2 thou/uL    Platelet Estimate Normal Normal    Ovalocytes 1+ (!) Negative       Imaging    Ct Chest Abdomen Pelvis With Oral With Iv Cont    Result Date: 3/28/2018  CT CHEST, ABDOMEN, AND PELVIS 3/28/2018 2:50 PM      INDICATION: Stage IV left upper lobe lung cancer diagnosed January 2016. TECHNIQUE: CT chest, abdomen, and pelvis. Dose reduction techniques were used. IV CONTRAST: Iohexol (Omni) 100 mL COMPARISON: 01/30/2018. FINDINGS: CHEST: Heterogeneous pulmonary nodules throughout the right chest some with air bronchograms have increased in size in the interval. Large infiltrative nodule in the right upper lobe (image 21) now measures  2.7 x 1.8 cm. this previously measured 2.2 x 1.7 cm. Poorly defined partially enhancing left upper lobe mass has likely grown as there are more enhancing components. It is surrounded by significant airspace consolidation of the entire left chest with significant volume loss. Pleural-based, pancake shaped tumor along the left anterior chest has not significantly changed. Progression of the supraclavicular and mediastinal adenopathy interval as well. A right paratracheal node now measures 1.9 cm (image 30). This previously still had a fatty hilum and measures 1.3 cm. Left subpectoral nodes have increased in size as well. No pulmonary emboli. ABDOMEN: Spleen is at the upper limits of normal at 14 cm. Cholelithiasis. No liver lesions. The adrenal glands are normal. Bilateral nonobstructing intrarenal calculi again noted. Stone burden is greater on the right and left tiny left renal cyst. Retroperitoneal adenopathy has increased in size as well. There are multiple left para-aortic nodes. The largest one measures 1.4 x 1 cm (image 106). Aorta is calcified and focally aneurysmal above the bifurcation 2.4 cm. Small bowel loops are normal. PELVIS: Bilateral pelvic lymphadenopathy again noted and all are a  bit more plump. No ascites. No peritoneal tumor nodules. Pelvic organs  are normal. Colon is unremarkable. MUSCULOSKELETAL: No suspicious bone lesions. Rotoscoliosis of the lumbar spine moderate to severe degenerative changes.     CONCLUSION: 1.  Notable progression of metastatic disease in the neck base, chest, abdomen and pelvis as described above. 2.  Continued extensive consolidation of the left lung surrounding the enlarging left upper lobe tumor. 3.  Other incidental findings as noted above.        Signed by: Aniket Patel MD

## 2021-06-17 NOTE — PROGRESS NOTES
Patient arrived this afternoon for lab port draw prior to seeing Dr. Patel for office visit. Patient using oxygen at 4-6Liters via [place] at all times. Port accessed in standard fashion with blood return confirmed and labs drawn as ordered. Patient had visit with Dr. Patel then returned for port de-access as patient reports no further treatment planned and she has plans to begin hospice. Patient did also have lunch while waiting for transportation. Port de-accessed in standard fashion/ patient discharged to home via medical transport van and assisted to exit via wheelchair.

## 2021-06-17 NOTE — PROGRESS NOTES
Patient is here today for labs, provider follow-up visit and possible chemotherapy.    Nicole Damico RN

## 2021-07-03 NOTE — ADDENDUM NOTE
Addendum Note by Virginia Chowdhury RN at 2/6/2017 11:03 AM     Author: Virginia Chowdhury RN Service: -- Author Type: Registered Nurse    Filed: 2/6/2017 11:03 AM Encounter Date: 1/31/2017 Status: Signed    : Virginia Chowdhury RN (Registered Nurse)    Addended by: VIRGINIA CHOWDHURY on: 2/6/2017 11:03 AM        Modules accepted: Orders

## 2021-07-03 NOTE — ADDENDUM NOTE
Addendum Note by Morena Saravia RN at 3/30/2018  1:49 PM     Author: Morena Saravia RN Service: -- Author Type: Registered Nurse    Filed: 3/30/2018  1:49 PM Encounter Date: 3/30/2018 Status: Signed    : Morena Saravia RN (Registered Nurse)    Addended by: MORENA SARAVIA on: 3/30/2018 01:49 PM        Modules accepted: Orders, SmartSet

## 2021-08-21 ENCOUNTER — HEALTH MAINTENANCE LETTER (OUTPATIENT)
Age: 76
End: 2021-08-21

## 2021-10-16 ENCOUNTER — HEALTH MAINTENANCE LETTER (OUTPATIENT)
Age: 76
End: 2021-10-16

## 2022-10-01 ENCOUNTER — HEALTH MAINTENANCE LETTER (OUTPATIENT)
Age: 77
End: 2022-10-01

## 2023-10-15 ENCOUNTER — HEALTH MAINTENANCE LETTER (OUTPATIENT)
Age: 78
End: 2023-10-15